# Patient Record
Sex: MALE | Race: WHITE | NOT HISPANIC OR LATINO
[De-identification: names, ages, dates, MRNs, and addresses within clinical notes are randomized per-mention and may not be internally consistent; named-entity substitution may affect disease eponyms.]

---

## 2023-03-12 PROBLEM — Z00.00 ENCOUNTER FOR PREVENTIVE HEALTH EXAMINATION: Status: ACTIVE | Noted: 2023-03-12

## 2023-03-13 ENCOUNTER — APPOINTMENT (OUTPATIENT)
Dept: ULTRASOUND IMAGING | Facility: CLINIC | Age: 58
End: 2023-03-13

## 2023-03-18 ENCOUNTER — APPOINTMENT (OUTPATIENT)
Dept: ULTRASOUND IMAGING | Facility: CLINIC | Age: 58
End: 2023-03-18
Payer: COMMERCIAL

## 2023-03-18 ENCOUNTER — OUTPATIENT (OUTPATIENT)
Dept: OUTPATIENT SERVICES | Facility: HOSPITAL | Age: 58
LOS: 1 days | End: 2023-03-18
Payer: COMMERCIAL

## 2023-03-18 DIAGNOSIS — Z00.8 ENCOUNTER FOR OTHER GENERAL EXAMINATION: ICD-10-CM

## 2023-03-18 DIAGNOSIS — Z98.89 OTHER SPECIFIED POSTPROCEDURAL STATES: Chronic | ICD-10-CM

## 2023-03-18 PROCEDURE — 93970 EXTREMITY STUDY: CPT

## 2023-03-18 PROCEDURE — 93970 EXTREMITY STUDY: CPT | Mod: 26

## 2023-07-08 ENCOUNTER — EMERGENCY (EMERGENCY)
Facility: HOSPITAL | Age: 58
LOS: 1 days | Discharge: HOME | End: 2023-07-08
Attending: EMERGENCY MEDICINE | Admitting: EMERGENCY MEDICINE
Payer: COMMERCIAL

## 2023-07-08 VITALS
HEIGHT: 66 IN | SYSTOLIC BLOOD PRESSURE: 176 MMHG | WEIGHT: 259.93 LBS | TEMPERATURE: 98 F | DIASTOLIC BLOOD PRESSURE: 96 MMHG | OXYGEN SATURATION: 95 % | RESPIRATION RATE: 20 BRPM | HEART RATE: 128 BPM

## 2023-07-08 VITALS
OXYGEN SATURATION: 99 % | RESPIRATION RATE: 18 BRPM | HEART RATE: 78 BPM | SYSTOLIC BLOOD PRESSURE: 118 MMHG | DIASTOLIC BLOOD PRESSURE: 62 MMHG | TEMPERATURE: 98 F

## 2023-07-08 DIAGNOSIS — Z98.89 OTHER SPECIFIED POSTPROCEDURAL STATES: Chronic | ICD-10-CM

## 2023-07-08 LAB
ALBUMIN SERPL ELPH-MCNC: 3.3 G/DL — SIGNIFICANT CHANGE UP (ref 3.3–5)
ALP SERPL-CCNC: 55 U/L — SIGNIFICANT CHANGE UP (ref 40–120)
ALT FLD-CCNC: 27 U/L — SIGNIFICANT CHANGE UP (ref 12–78)
ANION GAP SERPL CALC-SCNC: 8 MMOL/L — SIGNIFICANT CHANGE UP (ref 5–17)
APPEARANCE UR: CLEAR — SIGNIFICANT CHANGE UP
APTT BLD: 32.7 SEC — SIGNIFICANT CHANGE UP (ref 27.5–35.5)
AST SERPL-CCNC: 23 U/L — SIGNIFICANT CHANGE UP (ref 15–37)
BASE EXCESS BLDA CALC-SCNC: 0.9 MMOL/L — SIGNIFICANT CHANGE UP (ref -2–3)
BASOPHILS # BLD AUTO: 0.01 K/UL — SIGNIFICANT CHANGE UP (ref 0–0.2)
BASOPHILS NFR BLD AUTO: 0.2 % — SIGNIFICANT CHANGE UP (ref 0–2)
BILIRUB SERPL-MCNC: 1.4 MG/DL — HIGH (ref 0.2–1.2)
BILIRUB UR-MCNC: NEGATIVE — SIGNIFICANT CHANGE UP
BLOOD GAS COMMENTS ARTERIAL: SIGNIFICANT CHANGE UP
BUN SERPL-MCNC: 12 MG/DL — SIGNIFICANT CHANGE UP (ref 7–23)
CALCIUM SERPL-MCNC: 9.4 MG/DL — SIGNIFICANT CHANGE UP (ref 8.5–10.1)
CHLORIDE SERPL-SCNC: 102 MMOL/L — SIGNIFICANT CHANGE UP (ref 96–108)
CK MB BLD-MCNC: 1 % — SIGNIFICANT CHANGE UP (ref 0–3.5)
CK MB CFR SERPL CALC: 2.7 NG/ML — SIGNIFICANT CHANGE UP (ref 0–3.6)
CK SERPL-CCNC: 260 U/L — SIGNIFICANT CHANGE UP (ref 26–308)
CO2 SERPL-SCNC: 25 MMOL/L — SIGNIFICANT CHANGE UP (ref 22–31)
COLOR SPEC: YELLOW — SIGNIFICANT CHANGE UP
CREAT SERPL-MCNC: 1.1 MG/DL — SIGNIFICANT CHANGE UP (ref 0.5–1.3)
D DIMER BLD IA.RAPID-MCNC: 536 NG/ML DDU — HIGH
DIFF PNL FLD: ABNORMAL
EGFR: 77 ML/MIN/1.73M2 — SIGNIFICANT CHANGE UP
EOSINOPHIL # BLD AUTO: 0 K/UL — SIGNIFICANT CHANGE UP (ref 0–0.5)
EOSINOPHIL NFR BLD AUTO: 0 % — SIGNIFICANT CHANGE UP (ref 0–6)
GLUCOSE SERPL-MCNC: 184 MG/DL — HIGH (ref 70–99)
GLUCOSE UR QL: >=1000 MG/DL
HCO3 BLDA-SCNC: 25 MMOL/L — SIGNIFICANT CHANGE UP (ref 21–28)
HCT VFR BLD CALC: 42.6 % — SIGNIFICANT CHANGE UP (ref 39–50)
HGB BLD-MCNC: 14.6 G/DL — SIGNIFICANT CHANGE UP (ref 13–17)
IMM GRANULOCYTES NFR BLD AUTO: 0.4 % — SIGNIFICANT CHANGE UP (ref 0–0.9)
INR BLD: 2.4 RATIO — HIGH (ref 0.88–1.16)
KETONES UR-MCNC: 15 MG/DL
LACTATE SERPL-SCNC: 2.1 MMOL/L — HIGH (ref 0.7–2)
LACTATE SERPL-SCNC: 4.4 MMOL/L — CRITICAL HIGH (ref 0.7–2)
LEUKOCYTE ESTERASE UR-ACNC: NEGATIVE — SIGNIFICANT CHANGE UP
LIDOCAIN IGE QN: 155 U/L — SIGNIFICANT CHANGE UP (ref 73–393)
LYMPHOCYTES # BLD AUTO: 0.31 K/UL — LOW (ref 1–3.3)
LYMPHOCYTES # BLD AUTO: 6.5 % — LOW (ref 13–44)
MCHC RBC-ENTMCNC: 29.4 PG — SIGNIFICANT CHANGE UP (ref 27–34)
MCHC RBC-ENTMCNC: 34.3 GM/DL — SIGNIFICANT CHANGE UP (ref 32–36)
MCV RBC AUTO: 85.9 FL — SIGNIFICANT CHANGE UP (ref 80–100)
MONOCYTES # BLD AUTO: 0.07 K/UL — SIGNIFICANT CHANGE UP (ref 0–0.9)
MONOCYTES NFR BLD AUTO: 1.5 % — LOW (ref 2–14)
NEUTROPHILS # BLD AUTO: 4.38 K/UL — SIGNIFICANT CHANGE UP (ref 1.8–7.4)
NEUTROPHILS NFR BLD AUTO: 91.4 % — HIGH (ref 43–77)
NITRITE UR-MCNC: NEGATIVE — SIGNIFICANT CHANGE UP
NRBC # BLD: 0 /100 WBCS — SIGNIFICANT CHANGE UP (ref 0–0)
NT-PROBNP SERPL-SCNC: 145 PG/ML — HIGH (ref 0–125)
PCO2 BLDA: 38 MMHG — SIGNIFICANT CHANGE UP (ref 35–48)
PH BLDA: 7.43 — SIGNIFICANT CHANGE UP (ref 7.35–7.45)
PH UR: 5.5 — SIGNIFICANT CHANGE UP (ref 5–8)
PLATELET # BLD AUTO: 153 K/UL — SIGNIFICANT CHANGE UP (ref 150–400)
PO2 BLDA: 67 MMHG — LOW (ref 83–108)
POTASSIUM SERPL-MCNC: 3.2 MMOL/L — LOW (ref 3.5–5.3)
POTASSIUM SERPL-SCNC: 3.2 MMOL/L — LOW (ref 3.5–5.3)
PROT SERPL-MCNC: 6.8 G/DL — SIGNIFICANT CHANGE UP (ref 6–8.3)
PROT UR-MCNC: >=1000 MG/DL
PROTHROM AB SERPL-ACNC: 28.3 SEC — HIGH (ref 10.5–13.4)
RAPID RVP RESULT: SIGNIFICANT CHANGE UP
RBC # BLD: 4.96 M/UL — SIGNIFICANT CHANGE UP (ref 4.2–5.8)
RBC # FLD: 12.7 % — SIGNIFICANT CHANGE UP (ref 10.3–14.5)
SAO2 % BLDA: 95.6 % — SIGNIFICANT CHANGE UP (ref 94–98)
SARS-COV-2 RNA SPEC QL NAA+PROBE: SIGNIFICANT CHANGE UP
SODIUM SERPL-SCNC: 135 MMOL/L — SIGNIFICANT CHANGE UP (ref 135–145)
SP GR SPEC: 1.02 — SIGNIFICANT CHANGE UP (ref 1–1.03)
TROPONIN I, HIGH SENSITIVITY RESULT: 228.1 NG/L — HIGH
TROPONIN I, HIGH SENSITIVITY RESULT: 237.4 NG/L — HIGH
TROPONIN I, HIGH SENSITIVITY RESULT: 88.2 NG/L — HIGH
UROBILINOGEN FLD QL: 0.2 MG/DL — SIGNIFICANT CHANGE UP (ref 0.2–1)
WBC # BLD: 4.79 K/UL — SIGNIFICANT CHANGE UP (ref 3.8–10.5)
WBC # FLD AUTO: 4.79 K/UL — SIGNIFICANT CHANGE UP (ref 3.8–10.5)

## 2023-07-08 PROCEDURE — 71275 CT ANGIOGRAPHY CHEST: CPT | Mod: 26,MA

## 2023-07-08 PROCEDURE — 0225U NFCT DS DNA&RNA 21 SARSCOV2: CPT

## 2023-07-08 PROCEDURE — 82962 GLUCOSE BLOOD TEST: CPT

## 2023-07-08 PROCEDURE — 84484 ASSAY OF TROPONIN QUANT: CPT

## 2023-07-08 PROCEDURE — 83605 ASSAY OF LACTIC ACID: CPT

## 2023-07-08 PROCEDURE — 96375 TX/PRO/DX INJ NEW DRUG ADDON: CPT | Mod: XU

## 2023-07-08 PROCEDURE — 93010 ELECTROCARDIOGRAM REPORT: CPT | Mod: 76

## 2023-07-08 PROCEDURE — 85610 PROTHROMBIN TIME: CPT

## 2023-07-08 PROCEDURE — 87086 URINE CULTURE/COLONY COUNT: CPT

## 2023-07-08 PROCEDURE — 99285 EMERGENCY DEPT VISIT HI MDM: CPT

## 2023-07-08 PROCEDURE — 83880 ASSAY OF NATRIURETIC PEPTIDE: CPT

## 2023-07-08 PROCEDURE — 96374 THER/PROPH/DIAG INJ IV PUSH: CPT | Mod: XU

## 2023-07-08 PROCEDURE — 82553 CREATINE MB FRACTION: CPT

## 2023-07-08 PROCEDURE — 71045 X-RAY EXAM CHEST 1 VIEW: CPT | Mod: 26

## 2023-07-08 PROCEDURE — 87040 BLOOD CULTURE FOR BACTERIA: CPT

## 2023-07-08 PROCEDURE — 99284 EMERGENCY DEPT VISIT MOD MDM: CPT

## 2023-07-08 PROCEDURE — 71275 CT ANGIOGRAPHY CHEST: CPT | Mod: MA

## 2023-07-08 PROCEDURE — 71045 X-RAY EXAM CHEST 1 VIEW: CPT

## 2023-07-08 PROCEDURE — 81001 URINALYSIS AUTO W/SCOPE: CPT

## 2023-07-08 PROCEDURE — 83690 ASSAY OF LIPASE: CPT

## 2023-07-08 PROCEDURE — 93005 ELECTROCARDIOGRAM TRACING: CPT

## 2023-07-08 PROCEDURE — 82550 ASSAY OF CK (CPK): CPT

## 2023-07-08 PROCEDURE — 36415 COLL VENOUS BLD VENIPUNCTURE: CPT

## 2023-07-08 PROCEDURE — 99285 EMERGENCY DEPT VISIT HI MDM: CPT | Mod: 25

## 2023-07-08 PROCEDURE — 82803 BLOOD GASES ANY COMBINATION: CPT

## 2023-07-08 PROCEDURE — 85025 COMPLETE CBC W/AUTO DIFF WBC: CPT

## 2023-07-08 PROCEDURE — 80053 COMPREHEN METABOLIC PANEL: CPT

## 2023-07-08 PROCEDURE — 85379 FIBRIN DEGRADATION QUANT: CPT

## 2023-07-08 PROCEDURE — 85730 THROMBOPLASTIN TIME PARTIAL: CPT

## 2023-07-08 RX ORDER — IBUPROFEN 200 MG
600 TABLET ORAL ONCE
Refills: 0 | Status: COMPLETED | OUTPATIENT
Start: 2023-07-08 | End: 2023-07-08

## 2023-07-08 RX ORDER — MORPHINE SULFATE 50 MG/1
4 CAPSULE, EXTENDED RELEASE ORAL ONCE
Refills: 0 | Status: DISCONTINUED | OUTPATIENT
Start: 2023-07-08 | End: 2023-07-08

## 2023-07-08 RX ORDER — POTASSIUM CHLORIDE 20 MEQ
40 PACKET (EA) ORAL ONCE
Refills: 0 | Status: COMPLETED | OUTPATIENT
Start: 2023-07-08 | End: 2023-07-08

## 2023-07-08 RX ORDER — SODIUM CHLORIDE 9 MG/ML
1000 INJECTION INTRAMUSCULAR; INTRAVENOUS; SUBCUTANEOUS ONCE
Refills: 0 | Status: COMPLETED | OUTPATIENT
Start: 2023-07-08 | End: 2023-07-08

## 2023-07-08 RX ORDER — ONDANSETRON 8 MG/1
4 TABLET, FILM COATED ORAL ONCE
Refills: 0 | Status: COMPLETED | OUTPATIENT
Start: 2023-07-08 | End: 2023-07-08

## 2023-07-08 RX ADMIN — SODIUM CHLORIDE 1000 MILLILITER(S): 9 INJECTION INTRAMUSCULAR; INTRAVENOUS; SUBCUTANEOUS at 06:15

## 2023-07-08 RX ADMIN — MORPHINE SULFATE 4 MILLIGRAM(S): 50 CAPSULE, EXTENDED RELEASE ORAL at 03:49

## 2023-07-08 RX ADMIN — Medication 600 MILLIGRAM(S): at 12:53

## 2023-07-08 RX ADMIN — Medication 40 MILLIEQUIVALENT(S): at 06:55

## 2023-07-08 RX ADMIN — ONDANSETRON 4 MILLIGRAM(S): 8 TABLET, FILM COATED ORAL at 03:49

## 2023-07-08 RX ADMIN — MORPHINE SULFATE 4 MILLIGRAM(S): 50 CAPSULE, EXTENDED RELEASE ORAL at 04:04

## 2023-07-08 NOTE — ED ADULT NURSE NOTE - OBJECTIVE STATEMENT
57 y/o male BIBA. Alert and oriented x4. C/o SOB. Pt denies any cp, n/v/d, dizziness, headache, fever/chills at this time. Respirations even and slightly labored.

## 2023-07-08 NOTE — CONSULT NOTE ADULT - ASSESSMENT
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  Symptoms lasted about 30min.  He also mentions lower back pain for the past 3 days.  He said he was shaking so much that he could not drink water.  Denies chest pain.  Denies leg pain or swelling.  Patient reports tactile fever that also started when he awakened with symptoms.  Denies cough.  Denies urinary symptoms.   Denies smoking history.    He mentions that he saw a cardiologist last year (does not remember name) and had stress test 07/2022 which was "normal"    In ED CTA chest was neg for PE or dissection  Initially BP was elevated but now improved  Pt received morphine for back pain and zofran - back pain has improved  ECG shows NSR without ST-T wave abnormalities x2  Pt currently asymptomatic  Trop noted to be elevated 88 -> 228  Please repeat trop along with CK/CKMB    Thank you for this consult!

## 2023-07-08 NOTE — ED PROVIDER NOTE - PROGRESS NOTE DETAILS
Pt is feeling better. Denies SOB at this time  HR improved to low 90s  SPO2 94-95% on RA  Pt speaking in full sentences  CTA neg for PE or dissection  Will rpt LA and trop  Pulmonary consult requested Pt evaluated by Dr. Melgar, advised cardio eval. Cardio consult requested.   Pt will be endorsed to Dr. Mota to f/u rpt trop/LA and cardio eval. Patient was evaluated and then reevaluated by Dr. Shrestha, cardiologist and was aware of all labs as well as repeat labs including troponin CPK and CK-MB.  Patient was reevaluated at approximately 2 PM was feeling completely back to normal and wife who is at bedside agrees that he is back to normal and they want to go home and promised to follow-up with their primary cardiologist who they believe was Dr. Ro.

## 2023-07-08 NOTE — ED PROVIDER NOTE - OBJECTIVE STATEMENT
58-year-old male with history of diabetes, hypertension, BPH, hyperlipidemia presents complaining of sudden onset of shortness of breath started approximately 1:30 AM waking him up out of sleep.  Patient reports he was shaking and shivering and was diaphoretic.  Denies chest pain.  Denies leg pain or swelling.  Patient reports tactile fever that also started when he awakened with symptoms.  Denies cough.  Denies urinary symptoms.  Patient reports he is on Xarelto for history of PE and DVTs diagnosed last year.  Denies smoking history.  Patient reports in May or June he had repeat lower extremity Dopplers which showed resolution of the DVT.

## 2023-07-08 NOTE — ED ADULT NURSE NOTE - NSFALLUNIVINTERV_ED_ALL_ED
Bed/Stretcher in lowest position, wheels locked, appropriate side rails in place/Call bell, personal items and telephone in reach/Instruct patient to call for assistance before getting out of bed/chair/stretcher/Non-slip footwear applied when patient is off stretcher/Capac to call system/Physically safe environment - no spills, clutter or unnecessary equipment/Purposeful proactive rounding/Room/bathroom lighting operational, light cord in reach

## 2023-07-08 NOTE — ED PROVIDER NOTE - CLINICAL SUMMARY MEDICAL DECISION MAKING FREE TEXT BOX
58-year-old male with acute onset of dyspnea waking him up out of sleep proximately 1:30 AM.  Denies chest pain.  Patient was in his usual state of health when he went to bed.  Feeling better once he has arrived to the ED without any interventions.  Patient with history of DVT and PE for which she is on Xarelto starting last year.  Will evaluate for PE, ACS, CHF, PNA, PND. We will get labs, EKG, chest x-ray, CT angio.  Will reassess.

## 2023-07-08 NOTE — CONSULT NOTE ADULT - SUBJECTIVE AND OBJECTIVE BOX
Date/Time Patient Seen:  		  Referring MD:   Data Reviewed	       Patient is a 58y old  Male who presents with a chief complaint of     Subjective/HPI   58-year-old male with history of diabetes, hypertension, BPH, hyperlipidemia presents complaining of sudden onset of shortness of breath  PAST MEDICAL & SURGICAL HISTORY:  Type 2 diabetes mellitus    Hyperlipidemia    Hypertension    History of colonoscopy  ()    PAST SURGICAL HISTORY:  History of colonoscopy ().     FAMILY HISTORY:  Father  Still living? No  Family history of stroke, Age at diagnosis: 81-90    Mother  Still living? No  Family history of dementia, Age at diagnosis: Age Unknown    Sibling  Still living? Yes, Estimated age: Age Unknown  Family history of Alzheimer's disease, Age at diagnosis: Age Unknown.     Tobacco Usage:  · Tobacco Usage	Never smoker    ALLERGIES AND HOME MEDICATIONS:   Allergies:        Allergies:  	No Known Allergies:     Home Medications:   * Patient Currently Takes Medications as of 2016 12:23 documented in Structured Notes  · 	Pyridium 200 mg oral tablet: 1 tab(s) orally 3 times a day (after meals), As Needed for urinary discomfort  · 	METFORMIN    TAB 500Mmg orally 2 times a day  · 	VALSARTAN    TAB 320MG:  orally once a day (at bedtime)  · 	ATORVASTATIN TAB 10MG:  orally once a day (at bedtime)  · 	AMLODIPINE   TAB 5MG:  orally once a day  · 	multivitamin:   once a day    VITAL SIGNS ([place]):    ,,ED ADULT Flow Sheet:    2023 02:30  · Temp (F): 98  · Temp (C) Temp (C): 36.7  · Temp site Temp Site: oral  · Heart Rate Heart Rate (beats/min): 128  · BP Systolic Systolic: 176  · BP Diastolic Diastolic (mm Hg): 96  · Respiration Rate (breaths/min) Respiration Rate (breaths/min): 20  · SpO2 (%) SpO2 (%): 95  · O2 Delivery/Oxygen Delivery Method Patient On (Oxygen Delivery Method): room air  · How was the weight captured? Weight Type/Method: stated  · Dosing Weight (KILOGRAMS) Dosing Weight (KILOGRAMS): 117.9  · Dosing Weight  (POUNDS) Dosing Weight (POUNDS): 259.9  · Height type Height Type: stated  · Height (FEET) Height (FEET): 5  · Height (INCHES) Height (INCHES): 6  · Height (CENTIMETERS) Height (CENTIMETERS): 167.64  · BSA (m2): 2.24  · BMI (kG/m2) BMI (kG/m2): 42  · Ideal Body Weight(kg) Ideal Body Weight(kg): 64  · SpO2 (%) SpO2 (%): 95  · Preferred Language to Address Healthcare Preferred Language to Address Healthcare: English        Medication list         MEDICATIONS  (STANDING):    MEDICATIONS  (PRN):         Vitals log        ICU Vital Signs Last 24 Hrs  T(C): 36.7 (2023 02:30), Max: 36.7 (2023 02:30)  T(F): 98 (2023 02:30), Max: 98 (2023 02:30)  HR: 128 (2023 02:30) (128 - 128)  BP: 176/96 (2023 02:30) (176/96 - 176/96)  BP(mean): --  ABP: --  ABP(mean): --  RR: 20 (2023 02:30) (20 - 20)  SpO2: 95% (2023 02:30) (95% - 95%)    O2 Parameters below as of 2023 02:30  Patient On (Oxygen Delivery Method): room air                 Input and Output:  I&O's Detail      Lab Data                        14.6   4.79  )-----------( 153      ( 2023 04:10 )             42.6     07-08    135  |  102  |  12  ----------------------------<  184<H>  3.2<L>   |  25  |  1.10    Ca    9.4      2023 04:10    TPro  6.8  /  Alb  3.3  /  TBili  1.4<H>  /  DBili  x   /  AST  23  /  ALT  27  /  AlkPhos  55  07-08    ABG - ( 2023 05:29 )  pH, Arterial: 7.43  pH, Blood: x     /  pCO2: 38    /  pO2: 67    / HCO3: 25    / Base Excess: 0.9   /  SaO2: 95.6                    Review of Systems	  sob  anxious  back pain      Objective     Physical Examination  on o2 support  head nc  head at  verbal  alert  cn grossly int  moves all extr  chest symmetric        Pertinent Lab findings & Imaging      [place]:  NO   Adequate UO     I&O's Detail           Discussed with:     Cultures:	        Radiology      ACC: [id number] EXAM:  CT ANGIO CHEST PULM ART [facility]   ORDERED BY:  JOANNE SHEPARD     PROCEDURE DATE:  2023          INTERPRETATION:  CLINICAL INFORMATION: Shortness of breath, evaluate for   pulmonary embolism    COMPARISON: None.    CONTRAST/COMPLICATIONS:  IV Contrast: Omnipaque 300  70 cc administered   30 cc discarded  Oral Contrast: NONE  Complications: None reported at time of study completion    PROCEDURE:  CT Angiography of the Chest.  Sagittal and coronal reformats were performed as well as 3D (MIP)   reconstructions.    FINDINGS:    LUNGS AND AIRWAYS: Patent central airways.  Lungs are clear.  PLEURA: No pleural effusion.  MEDIASTINUM AND LILLY: No lymphadenopathy.  VESSELS: Pulmonary arterial opacification is suboptimal. There is no main   pulmonary embolism. Subsegmental branches cannot be assessed. There is no   aortic dissection. The great vessels are not dilated. Atherosclerotic   calcification of the thoracic aorta.  HEART: Heart size is normal. No pericardial effusion.  CHEST WALL AND LOWER NECK: Within normal limits.  VISUALIZED UPPER ABDOMEN: Fatty liver.  BONES: Within normal limits.    IMPRESSION:  No central pulmonary embolism. Subsegmental branches cannot be assessed.    No focal pulmonary opacity or pleural effusion.    --- End of Report ---            CATRACHITO HDEZ MD; Attending Radiologist  This document has been electronically signed. 2023  6:18AM

## 2023-07-08 NOTE — ED PROVIDER NOTE - NSFOLLOWUPINSTRUCTIONS_ED_ALL_ED_FT
Follow up with your primary care doctor in 2-3 days.   Return to the ER if symptoms worsen    Shortness of Breath, Adult  Shortness of breath means you have trouble breathing. Shortness of breath could be a sign of a medical problem.    Follow these instructions at home:  A sign showing that a person should not smoke.  Pollution    Do not smoke or use any products that contain nicotine or tobacco. If you need help quitting, ask your doctor.  Avoid things that can make it harder to breathe, such as:  Smoke of all kinds. This includes smoke from campfires or forest fires. Do not smoke or allow others to smoke in your home.  Mold.  Dust.  Air pollution.  Chemical smells.  Things that can give you an allergic reaction (allergens) if you have allergies.  Keep your living space clean. Use products that help remove mold and dust.  General instructions    Watch for any changes in your symptoms.  Take over-the-counter and prescription medicines only as told by your doctor. This includes oxygen therapy and inhaled medicines.  Rest as needed.  Return to your normal activities when your doctor says that it is safe.  Keep all follow-up visits.  Contact a doctor if:  Your condition does not get better as soon as expected.  You have a hard time doing your normal activities, even after you rest.  You have new symptoms.  You cannot walk up stairs.  You cannot exercise the way you normally do.  Get help right away if:  Your shortness of breath gets worse.  You have trouble breathing when you are resting.  You feel light-headed or you faint.  You have a cough that is not helped by medicines.  You cough up blood.  You have pain with breathing.  You have pain in your chest, arms, shoulders, or belly (abdomen).  You have a fever.  These symptoms may be an emergency. Get help right away. Call 911.  Do not wait to see if the symptoms will go away.  Do not drive yourself to the hospital.  Summary  Shortness of breath is when you have trouble breathing enough air. It can be a sign of a medical problem.  Avoid things that make it hard for you to breathe, such as smoking, pollution, mold, and dust.  Watch for any changes in your symptoms. Contact your doctor if you do not get better or you get worse.  This information is not intended to replace advice given to you by your health care provider. Make sure you discuss any questions you have with your health care provider.    Document Revised: 08/06/2022 Document Reviewed: 08/06/2022

## 2023-07-08 NOTE — ED PROVIDER NOTE - NSICDXFAMILYHX_GEN_ALL_CORE_FT
FAMILY HISTORY:  Father  Still living? No  Family history of stroke, Age at diagnosis: 81-90    Mother  Still living? No  Family history of dementia, Age at diagnosis: Age Unknown    Sibling  Still living? Yes, Estimated age: Age Unknown  Family history of Alzheimer's disease, Age at diagnosis: Age Unknown

## 2023-07-08 NOTE — ED PROVIDER NOTE - PATIENT PORTAL LINK FT
You can access the FollowMyHealth Patient Portal offered by [facility] by registering at the following website: [email or web address]. By joining [employer]’s FollowMyHealth portal, you will also be able to view your health information using other applications (apps) compatible with our system.

## 2023-07-08 NOTE — CONSULT NOTE ADULT - ASSESSMENT
58-year-old male with history of diabetes, hypertension, BPH, hyperlipidemia presents complaining of sudden onset of shortness of breath    suspect FERNY  Obesity  HTN  BPH  hx of DVT PE  DM  HLD    reported sudden dyspnea  ct chest - grossly - neg for pe - limited study, however, no central PE  suspect FERNY - eval as outpatient  check Sat on RA - at rest and on exertion  goal sat > 90 pct   back pain eval -   Cardio eval - trops noted  pt is on Xarelto - for hx of DVT PE - reports full compliance, reflected in COAGS  proBNP noted - ABG noted -   Lactic acid noted - would repeat prior to discharge  no acute pulm path on current exam

## 2023-07-08 NOTE — CONSULT NOTE ADULT - SUBJECTIVE AND OBJECTIVE BOX
[facility] - Pedro Saeed, Rodrick Rosario, Fady Noland  Office Number: [phone]    Initial Consult Note    CHIEF COMPLAINT: Patient is a 58y old  Male who presents with a chief complaint of shaking chills    HPI: 58-year-old male with history of PE/DVT 08/2022 on xarelto, diabetes, hypertension, hyperlipidemia presents complaining of shaking chills and SOB which woke him from sleep last night.  Symptoms lasted about 30min.  He also mentions lower back pain for the past 3 days.  He said he was shaking so much that he could not drink water.  Denies chest pain.  Denies leg pain or swelling.  Patient reports tactile fever that also started when he awakened with symptoms.  Denies cough.  Denies urinary symptoms.   Denies smoking history.    He mentions that he saw a cardiologist last year (does not remember name) and had stress test 07/2022 which was "normal"    In ED CTA chest was neg for PE or dissection  Initially BP was elevated but now improved  Pt received morphine for back pain and zofran  Trop noted to be elevated 88 -> 228  ECG shows NSR without ST-T wave abnormalities      PAST MEDICAL & SURGICAL HISTORY:  Type 2 diabetes mellitus      Hyperlipidemia      Hypertension      History of colonoscopy  (2014)          SOCIAL HISTORY:  No tobacco, ethanol, or drug abuse.    FAMILY HISTORY:  Family history of stroke (Father)    Family history of dementia (Mother)    Family history of Alzheimer's disease (Sibling)      No family history of acute MI or sudden cardiac death.    MEDICATIONS  (STANDING):    MEDICATIONS  (PRN):      Allergies    No Known Allergies    Intolerances        REVIEW OF SYSTEMS:    CONSTITUTIONAL: No weakness, +fevers and chills  EYES/ENT: No visual changes;  No vertigo or throat pain   NECK: No pain or stiffness  RESPIRATORY: No cough, wheezing, hemoptysis; + shortness of breath  CARDIOVASCULAR: No chest pain or palpitations  GASTROINTESTINAL: No abdominal pain. No nausea, vomiting, or hematemesis; No diarrhea or constipation. No melena or hematochezia.  GENITOURINARY: No dysuria, frequency or hematuria  NEUROLOGICAL: No numbness or weakness  SKIN: No itching or rash  All other review of systems is negative unless indicated above    VITAL SIGNS:   Vital Signs Last 24 Hrs  T(C): 36.4 (08 Jul 2023 07:54), Max: 36.7 (08 Jul 2023 02:30)  T(F): 97.6 (08 Jul 2023 07:54), Max: 98 (08 Jul 2023 02:30)  HR: 81 (08 Jul 2023 07:54) (81 - 128)  BP: 115/52 (08 Jul 2023 07:54) (115/52 - 176/96)  BP(mean): --  RR: 17 (08 Jul 2023 07:54) (17 - 20)  SpO2: 98% (08 Jul 2023 07:54) (95% - 98%)    Parameters below as of 08 Jul 2023 07:54  Patient On (Oxygen Delivery Method): nasal cannula  O2 Flow (L/min): 2      I&O's Summary      On Exam:    Constitutional: NAD, alert and oriented x 3  Lungs:  Non-labored, breath sounds are clear bilaterally, No wheezing, rales or rhonchi  Cardiovascular: RRR.  S1 and S2 positive.  No murmurs, rubs, gallops or clicks  Gastrointestinal: Bowel Sounds present, soft, nontender.   Lymph: No peripheral edema. No cervical lymphadenopathy.  Neurological: Alert, no focal deficits  Skin: No rashes or ulcers   Psych:  Mood & affect appropriate.    LABS: All Labs Reviewed:                        14.6   4.79  )-----------( 153      ( 08 Jul 2023 04:10 )             42.6     08 Jul 2023 04:10    135    |  102    |  12     ----------------------------<  184    3.2     |  25     |  1.10     Ca    9.4        08 Jul 2023 04:10    TPro  6.8    /  Alb  3.3    /  TBili  1.4    /  DBili  x      /  AST  23     /  ALT  27     /  AlkPhos  55     08 Jul 2023 04:10    PT/INR - ( 08 Jul 2023 04:10 )   PT: 28.3 sec;   INR: 2.40 ratio         PTT - ( 08 Jul 2023 04:10 )  PTT:32.7 sec      Blood Culture:         RADIOLOGY:    EKG:

## 2023-07-09 LAB
CULTURE RESULTS: SIGNIFICANT CHANGE UP
SPECIMEN SOURCE: SIGNIFICANT CHANGE UP

## 2023-07-10 ENCOUNTER — INPATIENT (INPATIENT)
Facility: HOSPITAL | Age: 58
LOS: 3 days | Discharge: HOME | DRG: 864 | End: 2023-07-14
Attending: INTERNAL MEDICINE | Admitting: HOSPITALIST
Payer: COMMERCIAL

## 2023-07-10 VITALS
WEIGHT: 259.93 LBS | HEART RATE: 123 BPM | OXYGEN SATURATION: 95 % | DIASTOLIC BLOOD PRESSURE: 99 MMHG | SYSTOLIC BLOOD PRESSURE: 174 MMHG | RESPIRATION RATE: 30 BRPM | HEIGHT: 66 IN | TEMPERATURE: 103 F

## 2023-07-10 DIAGNOSIS — Z98.89 OTHER SPECIFIED POSTPROCEDURAL STATES: Chronic | ICD-10-CM

## 2023-07-10 DIAGNOSIS — R50.9 FEVER, UNSPECIFIED: ICD-10-CM

## 2023-07-10 LAB
ALBUMIN SERPL ELPH-MCNC: 3.1 G/DL — LOW (ref 3.3–5)
ALP SERPL-CCNC: 63 U/L — SIGNIFICANT CHANGE UP (ref 40–120)
ALT FLD-CCNC: 30 U/L — SIGNIFICANT CHANGE UP (ref 12–78)
ANION GAP SERPL CALC-SCNC: 10 MMOL/L — SIGNIFICANT CHANGE UP (ref 5–17)
APPEARANCE UR: CLEAR — SIGNIFICANT CHANGE UP
APTT BLD: 26 SEC — LOW (ref 27.5–35.5)
AST SERPL-CCNC: 43 U/L — HIGH (ref 15–37)
BACTERIA # UR AUTO: ABNORMAL /HPF
BASOPHILS # BLD AUTO: 0.01 K/UL — SIGNIFICANT CHANGE UP (ref 0–0.2)
BASOPHILS NFR BLD AUTO: 0.2 % — SIGNIFICANT CHANGE UP (ref 0–2)
BILIRUB SERPL-MCNC: 0.8 MG/DL — SIGNIFICANT CHANGE UP (ref 0.2–1.2)
BILIRUB UR-MCNC: NEGATIVE — SIGNIFICANT CHANGE UP
BUN SERPL-MCNC: 14 MG/DL — SIGNIFICANT CHANGE UP (ref 7–23)
CALCIUM SERPL-MCNC: 9.6 MG/DL — SIGNIFICANT CHANGE UP (ref 8.5–10.1)
CHLORIDE SERPL-SCNC: 102 MMOL/L — SIGNIFICANT CHANGE UP (ref 96–108)
CO2 SERPL-SCNC: 24 MMOL/L — SIGNIFICANT CHANGE UP (ref 22–31)
COLOR SPEC: YELLOW — SIGNIFICANT CHANGE UP
CREAT SERPL-MCNC: 1.1 MG/DL — SIGNIFICANT CHANGE UP (ref 0.5–1.3)
CRP SERPL-MCNC: 162 MG/L — HIGH
DIFF PNL FLD: ABNORMAL
EGFR: 77 ML/MIN/1.73M2 — SIGNIFICANT CHANGE UP
EOSINOPHIL # BLD AUTO: 0 K/UL — SIGNIFICANT CHANGE UP (ref 0–0.5)
EOSINOPHIL NFR BLD AUTO: 0 % — SIGNIFICANT CHANGE UP (ref 0–6)
EPI CELLS # UR: SIGNIFICANT CHANGE UP
GLUCOSE SERPL-MCNC: 219 MG/DL — HIGH (ref 70–99)
GLUCOSE UR QL: >=1000 MG/DL
HCT VFR BLD CALC: 42 % — SIGNIFICANT CHANGE UP (ref 39–50)
HGB BLD-MCNC: 14.2 G/DL — SIGNIFICANT CHANGE UP (ref 13–17)
IMM GRANULOCYTES NFR BLD AUTO: 0.2 % — SIGNIFICANT CHANGE UP (ref 0–0.9)
INR BLD: 1.2 RATIO — HIGH (ref 0.88–1.16)
KETONES UR-MCNC: ABNORMAL MG/DL
LACTATE SERPL-SCNC: 1.3 MMOL/L — SIGNIFICANT CHANGE UP (ref 0.7–2)
LACTATE SERPL-SCNC: 4.3 MMOL/L — CRITICAL HIGH (ref 0.7–2)
LEUKOCYTE ESTERASE UR-ACNC: NEGATIVE — SIGNIFICANT CHANGE UP
LYMPHOCYTES # BLD AUTO: 0.9 K/UL — LOW (ref 1–3.3)
LYMPHOCYTES # BLD AUTO: 22.2 % — SIGNIFICANT CHANGE UP (ref 13–44)
MCHC RBC-ENTMCNC: 29.1 PG — SIGNIFICANT CHANGE UP (ref 27–34)
MCHC RBC-ENTMCNC: 33.8 GM/DL — SIGNIFICANT CHANGE UP (ref 32–36)
MCV RBC AUTO: 86.1 FL — SIGNIFICANT CHANGE UP (ref 80–100)
MONOCYTES # BLD AUTO: 0.17 K/UL — SIGNIFICANT CHANGE UP (ref 0–0.9)
MONOCYTES NFR BLD AUTO: 4.2 % — SIGNIFICANT CHANGE UP (ref 2–14)
NEUTROPHILS # BLD AUTO: 2.97 K/UL — SIGNIFICANT CHANGE UP (ref 1.8–7.4)
NEUTROPHILS NFR BLD AUTO: 73.2 % — SIGNIFICANT CHANGE UP (ref 43–77)
NITRITE UR-MCNC: NEGATIVE — SIGNIFICANT CHANGE UP
NRBC # BLD: 0 /100 WBCS — SIGNIFICANT CHANGE UP (ref 0–0)
PH UR: 5.5 — SIGNIFICANT CHANGE UP (ref 5–8)
PLATELET # BLD AUTO: 151 K/UL — SIGNIFICANT CHANGE UP (ref 150–400)
POTASSIUM SERPL-MCNC: 3.8 MMOL/L — SIGNIFICANT CHANGE UP (ref 3.5–5.3)
POTASSIUM SERPL-SCNC: 3.8 MMOL/L — SIGNIFICANT CHANGE UP (ref 3.5–5.3)
PROCALCITONIN SERPL-MCNC: 29.32 NG/ML — HIGH
PROT SERPL-MCNC: 7.1 G/DL — SIGNIFICANT CHANGE UP (ref 6–8.3)
PROT UR-MCNC: >=1000 MG/DL
PROTHROM AB SERPL-ACNC: 14.1 SEC — HIGH (ref 10.5–13.4)
PSA FLD-MCNC: 35.4 NG/ML — HIGH (ref 0–4)
RAPID RVP RESULT: SIGNIFICANT CHANGE UP
RBC # BLD: 4.88 M/UL — SIGNIFICANT CHANGE UP (ref 4.2–5.8)
RBC # FLD: 12.6 % — SIGNIFICANT CHANGE UP (ref 10.3–14.5)
RBC CASTS # UR COMP ASSIST: SIGNIFICANT CHANGE UP /HPF (ref 0–4)
SARS-COV-2 RNA SPEC QL NAA+PROBE: SIGNIFICANT CHANGE UP
SODIUM SERPL-SCNC: 136 MMOL/L — SIGNIFICANT CHANGE UP (ref 135–145)
SP GR SPEC: 1.02 — SIGNIFICANT CHANGE UP (ref 1–1.03)
TROPONIN I, HIGH SENSITIVITY RESULT: 1148.3 NG/L — HIGH
TROPONIN I, HIGH SENSITIVITY RESULT: 364.5 NG/L — HIGH
TROPONIN I, HIGH SENSITIVITY RESULT: 741.2 NG/L — HIGH
UROBILINOGEN FLD QL: 0.2 MG/DL — SIGNIFICANT CHANGE UP (ref 0.2–1)
WBC # BLD: 4.06 K/UL — SIGNIFICANT CHANGE UP (ref 3.8–10.5)
WBC # FLD AUTO: 4.06 K/UL — SIGNIFICANT CHANGE UP (ref 3.8–10.5)
WBC UR QL: SIGNIFICANT CHANGE UP /HPF (ref 0–5)

## 2023-07-10 PROCEDURE — 93010 ELECTROCARDIOGRAM REPORT: CPT

## 2023-07-10 PROCEDURE — 99285 EMERGENCY DEPT VISIT HI MDM: CPT

## 2023-07-10 PROCEDURE — 99223 1ST HOSP IP/OBS HIGH 75: CPT

## 2023-07-10 PROCEDURE — 74176 CT ABD & PELVIS W/O CONTRAST: CPT | Mod: 26,MA

## 2023-07-10 RX ORDER — SODIUM CHLORIDE 9 MG/ML
1000 INJECTION, SOLUTION INTRAVENOUS
Refills: 0 | Status: DISCONTINUED | OUTPATIENT
Start: 2023-07-10 | End: 2023-07-14

## 2023-07-10 RX ORDER — RIVAROXABAN 15 MG-20MG
1 KIT ORAL
Refills: 0 | DISCHARGE

## 2023-07-10 RX ORDER — DUTASTERIDE 0.5 MG/1
1 CAPSULE, LIQUID FILLED ORAL
Refills: 0 | DISCHARGE

## 2023-07-10 RX ORDER — ACETAMINOPHEN 500 MG
650 TABLET ORAL EVERY 6 HOURS
Refills: 0 | Status: DISCONTINUED | OUTPATIENT
Start: 2023-07-10 | End: 2023-07-14

## 2023-07-10 RX ORDER — OXYCODONE HYDROCHLORIDE 5 MG/1
5 TABLET ORAL EVERY 4 HOURS
Refills: 0 | Status: DISCONTINUED | OUTPATIENT
Start: 2023-07-10 | End: 2023-07-11

## 2023-07-10 RX ORDER — GLIMEPIRIDE 1 MG
1 TABLET ORAL
Refills: 0 | DISCHARGE

## 2023-07-10 RX ORDER — ASPIRIN/CALCIUM CARB/MAGNESIUM 324 MG
324 TABLET ORAL ONCE
Refills: 0 | Status: COMPLETED | OUTPATIENT
Start: 2023-07-10 | End: 2023-07-10

## 2023-07-10 RX ORDER — GLUCAGON INJECTION, SOLUTION 0.5 MG/.1ML
1 INJECTION, SOLUTION SUBCUTANEOUS ONCE
Refills: 0 | Status: DISCONTINUED | OUTPATIENT
Start: 2023-07-10 | End: 2023-07-14

## 2023-07-10 RX ORDER — DEXTROSE 50 % IN WATER 50 %
15 SYRINGE (ML) INTRAVENOUS ONCE
Refills: 0 | Status: DISCONTINUED | OUTPATIENT
Start: 2023-07-10 | End: 2023-07-14

## 2023-07-10 RX ORDER — CEFTRIAXONE 500 MG/1
1000 INJECTION, POWDER, FOR SOLUTION INTRAMUSCULAR; INTRAVENOUS ONCE
Refills: 0 | Status: COMPLETED | OUTPATIENT
Start: 2023-07-10 | End: 2023-07-10

## 2023-07-10 RX ORDER — DEXTROSE 50 % IN WATER 50 %
25 SYRINGE (ML) INTRAVENOUS ONCE
Refills: 0 | Status: DISCONTINUED | OUTPATIENT
Start: 2023-07-10 | End: 2023-07-14

## 2023-07-10 RX ORDER — SODIUM CHLORIDE 9 MG/ML
2600 INJECTION INTRAMUSCULAR; INTRAVENOUS; SUBCUTANEOUS ONCE
Refills: 0 | Status: COMPLETED | OUTPATIENT
Start: 2023-07-10 | End: 2023-07-10

## 2023-07-10 RX ORDER — RIVAROXABAN 15 MG-20MG
20 KIT ORAL
Refills: 0 | Status: DISCONTINUED | OUTPATIENT
Start: 2023-07-10 | End: 2023-07-12

## 2023-07-10 RX ORDER — TAMSULOSIN HYDROCHLORIDE 0.4 MG/1
1 CAPSULE ORAL
Refills: 0 | DISCHARGE

## 2023-07-10 RX ORDER — ONDANSETRON 8 MG/1
4 TABLET, FILM COATED ORAL EVERY 8 HOURS
Refills: 0 | Status: DISCONTINUED | OUTPATIENT
Start: 2023-07-10 | End: 2023-07-14

## 2023-07-10 RX ORDER — DEXTROSE 50 % IN WATER 50 %
12.5 SYRINGE (ML) INTRAVENOUS ONCE
Refills: 0 | Status: DISCONTINUED | OUTPATIENT
Start: 2023-07-10 | End: 2023-07-14

## 2023-07-10 RX ORDER — AMLODIPINE BESYLATE 2.5 MG/1
5 TABLET ORAL DAILY
Refills: 0 | Status: DISCONTINUED | OUTPATIENT
Start: 2023-07-10 | End: 2023-07-14

## 2023-07-10 RX ORDER — INSULIN LISPRO 100/ML
VIAL (ML) SUBCUTANEOUS
Refills: 0 | Status: DISCONTINUED | OUTPATIENT
Start: 2023-07-10 | End: 2023-07-14

## 2023-07-10 RX ORDER — ACETAMINOPHEN 500 MG
650 TABLET ORAL ONCE
Refills: 0 | Status: COMPLETED | OUTPATIENT
Start: 2023-07-10 | End: 2023-07-10

## 2023-07-10 RX ORDER — KETOROLAC TROMETHAMINE 30 MG/ML
30 SYRINGE (ML) INJECTION ONCE
Refills: 0 | Status: DISCONTINUED | OUTPATIENT
Start: 2023-07-10 | End: 2023-07-10

## 2023-07-10 RX ORDER — LANOLIN ALCOHOL/MO/W.PET/CERES
3 CREAM (GRAM) TOPICAL AT BEDTIME
Refills: 0 | Status: DISCONTINUED | OUTPATIENT
Start: 2023-07-10 | End: 2023-07-14

## 2023-07-10 RX ORDER — HYDROMORPHONE HYDROCHLORIDE 2 MG/ML
0.5 INJECTION INTRAMUSCULAR; INTRAVENOUS; SUBCUTANEOUS EVERY 4 HOURS
Refills: 0 | Status: DISCONTINUED | OUTPATIENT
Start: 2023-07-10 | End: 2023-07-14

## 2023-07-10 RX ORDER — ATORVASTATIN CALCIUM 80 MG/1
10 TABLET, FILM COATED ORAL AT BEDTIME
Refills: 0 | Status: DISCONTINUED | OUTPATIENT
Start: 2023-07-10 | End: 2023-07-12

## 2023-07-10 RX ORDER — FINASTERIDE 5 MG/1
5 TABLET, FILM COATED ORAL DAILY
Refills: 0 | Status: DISCONTINUED | OUTPATIENT
Start: 2023-07-10 | End: 2023-07-14

## 2023-07-10 RX ORDER — TAMSULOSIN HYDROCHLORIDE 0.4 MG/1
0.4 CAPSULE ORAL AT BEDTIME
Refills: 0 | Status: DISCONTINUED | OUTPATIENT
Start: 2023-07-10 | End: 2023-07-14

## 2023-07-10 RX ORDER — INSULIN GLARGINE 100 [IU]/ML
5 INJECTION, SOLUTION SUBCUTANEOUS AT BEDTIME
Refills: 0 | Status: DISCONTINUED | OUTPATIENT
Start: 2023-07-10 | End: 2023-07-11

## 2023-07-10 RX ORDER — INSULIN LISPRO 100/ML
2 VIAL (ML) SUBCUTANEOUS
Refills: 0 | Status: DISCONTINUED | OUTPATIENT
Start: 2023-07-10 | End: 2023-07-11

## 2023-07-10 RX ADMIN — Medication 2 UNIT(S): at 12:52

## 2023-07-10 RX ADMIN — Medication 324 MILLIGRAM(S): at 03:57

## 2023-07-10 RX ADMIN — CEFTRIAXONE 1000 MILLIGRAM(S): 500 INJECTION, POWDER, FOR SOLUTION INTRAMUSCULAR; INTRAVENOUS at 06:05

## 2023-07-10 RX ADMIN — Medication 30 MILLIGRAM(S): at 12:52

## 2023-07-10 RX ADMIN — AMLODIPINE BESYLATE 5 MILLIGRAM(S): 2.5 TABLET ORAL at 15:09

## 2023-07-10 RX ADMIN — TAMSULOSIN HYDROCHLORIDE 0.4 MILLIGRAM(S): 0.4 CAPSULE ORAL at 21:52

## 2023-07-10 RX ADMIN — CEFTRIAXONE 100 MILLIGRAM(S): 500 INJECTION, POWDER, FOR SOLUTION INTRAMUSCULAR; INTRAVENOUS at 05:32

## 2023-07-10 RX ADMIN — OXYCODONE HYDROCHLORIDE 5 MILLIGRAM(S): 5 TABLET ORAL at 21:02

## 2023-07-10 RX ADMIN — Medication 650 MILLIGRAM(S): at 01:50

## 2023-07-10 RX ADMIN — OXYCODONE HYDROCHLORIDE 5 MILLIGRAM(S): 5 TABLET ORAL at 20:04

## 2023-07-10 RX ADMIN — Medication 650 MILLIGRAM(S): at 17:19

## 2023-07-10 RX ADMIN — Medication 2: at 12:51

## 2023-07-10 RX ADMIN — SODIUM CHLORIDE 2600 MILLILITER(S): 9 INJECTION INTRAMUSCULAR; INTRAVENOUS; SUBCUTANEOUS at 04:15

## 2023-07-10 RX ADMIN — RIVAROXABAN 20 MILLIGRAM(S): KIT at 18:43

## 2023-07-10 RX ADMIN — Medication 1: at 17:18

## 2023-07-10 RX ADMIN — Medication 650 MILLIGRAM(S): at 02:50

## 2023-07-10 RX ADMIN — FINASTERIDE 5 MILLIGRAM(S): 5 TABLET, FILM COATED ORAL at 15:09

## 2023-07-10 RX ADMIN — Medication 30 MILLIGRAM(S): at 13:44

## 2023-07-10 RX ADMIN — ATORVASTATIN CALCIUM 10 MILLIGRAM(S): 80 TABLET, FILM COATED ORAL at 21:52

## 2023-07-10 RX ADMIN — Medication 2 UNIT(S): at 17:19

## 2023-07-10 RX ADMIN — SODIUM CHLORIDE 2600 MILLILITER(S): 9 INJECTION INTRAMUSCULAR; INTRAVENOUS; SUBCUTANEOUS at 01:50

## 2023-07-10 RX ADMIN — INSULIN GLARGINE 5 UNIT(S): 100 INJECTION, SOLUTION SUBCUTANEOUS at 21:53

## 2023-07-10 NOTE — CONSULT NOTE ADULT - ASSESSMENT
58-year-old male with history of diabetes, hypertension, BPH, hyperlipidemia - presents with FEVER - Back Pain - Dyspnea - Weakness    suspect FERNY  Obesity  HTN  BPH  hx of DVT PE  DM  HLD  Nephrolithiasis - Bladder Stones -     CT renal stone hunt noted -   Lactic acid trend noted - fever noted -   trops noted  emp ABX  ID and Cardio eval  DOAC for VTE - as per home rx regimen  FERNY eval as outpatient - weight management and sleep hygiene review - discussed  monitor VS and HD and Sat  UA - Cx - Biomarkers -   ACAP prn for Fever  DM care - serial FS -   recent visit on 7/8 - CT neg for PE, LE doppler neg for DVT, ABG noted

## 2023-07-10 NOTE — ED PROVIDER NOTE - OBJECTIVE STATEMENT
fever 103F, tachycardia, SOB , seen earlier for SOB, tactile fever  elevated lactate,  CT angio was negative fever 103F, tachycardia, SOB , seen earlier for SOB, tactile fever  elevated lactate, d-dimer and troponin   CT angio was negative evaluated by cardiology and pulmonary per ems from home with shortness of breath and lower back pain. pt was in ed Friday. oxygen saturation 86%.  fever 103F, tachycardia, SOB , seen earlier for SOB, tactile fever  elevated lactate, d-dimer and troponin   CT angio was negative evaluated by cardiology and pulmonary 59 y/o male h/o DM type-2 HTN HLD,  PE/DVT on Xarelto   per ems from home with shortness of breath and lower back pain.   In the ED he had of fever 103F, tachycardia, SOB, back pain, He was  seen in the ED 24 hours earlier for SOB, tactile fever,  back pain and hypoxemia,  He was found to have an  elevated lactate, d-dimer and troponin,    CT angio was negative evaluated by cardiology and pulmonary 57 y/o male h/o DM type-2 HTN HLD,  PE/DVT on Xarelto   per ems from home with shortness of breath and lower back pain.   In the ED he had of fever 103F, tachycardia, SOB, back pain, He was  seen in the ED 24 hours earlier for SOB, tactile fever,  back pain and hypoxemia,  He was found to have an  elevated lactate, d-dimer and troponin,    CT angio was negative evaluated by cardiology and pulmonary  PMD Dr Galvez 59 y/o male h/o DM type-2 HTN HLD,  PE/DVT on Xarelto   BIBEMS  with shortness of breath, palpitations,  chills  and lower back pain.  No HA, no rash, no toxemia,  no chest pain, no abdominal pain, no N/V/D,  no stroke symptoms   In the ED he had a fever of 103F, tachycardia, dyspneic , back pain, He was  seen in the ED 24 hours earlier for SOB, tactile fever,  back pain and hypoxemia,  He was found to have an  elevated lactate, d-dimer and troponin,    CT angio was negative for central embolus, He was consulted  by cardiology and pulmonary and  symptoms resolved,  the patient was discharged.    PMD Dr Galvez

## 2023-07-10 NOTE — ED ADULT NURSE NOTE - OBJECTIVE STATEMENT
pt BIBA from home accompanied by his wife.  febrile tmax 103 diaphoretic  SOB o2 sat 88% via room air

## 2023-07-10 NOTE — CONSULT NOTE ADULT - SUBJECTIVE AND OBJECTIVE BOX
[facility]         Fady Saeed, Pedro, Rodrick Rosario Savella        [phone] (office)    Reason for Consult: SOB, elevated troponins      HPI:  59 y/o male h/o DM type-2 HTN HLD,  PE/DVT on Xarelto BIBEMS  with shortness of breath, palpitations,  chills  and lower back pain.  No HA, no rash, no toxemia,  no chest pain, no abdominal pain, no N/V/D,  no stroke symptoms. In the ED he had a fever of 103F, tachycardia, dyspneic , back pain, He was  seen in the ED 24 hours earlier for SOB, tactile fever,  back pain and hypoxemia,  He was found to have an elevated lactate, d-dimer and troponin, CT angio was negative for central embolus, He was consulted  by cardiology and pulmonary and  symptoms resolved,  the patient was discharged at that time.   PMD Dr Galvez      PAST MEDICAL & SURGICAL HISTORY:  Type 2 diabetes mellitus      Hyperlipidemia      Hypertension      History of colonoscopy  ()          SOCIAL HISTORY: No active tobacco, alcohol or illicit drug use    FAMILY HISTORY:  Family history of stroke (Father)    Family history of dementia (Mother)    Family history of Alzheimer's disease (Sibling)        Home Medications:  AMLODIPINE   TAB 5MG:  orally once a day (10 Jul 2023 12:05)  ATORVASTATIN TAB 10MG:  orally once a day (at bedtime) (10 Jul 2023 12:05)  dutasteride 0.5 mg oral capsule: 1 orally once a day (10 Jul 2023 12:04)  Flomax 0.4 mg oral capsule: 1 orally once a day (at bedtime) (10 Jul 2023 12:04)  glimepiride 2 mg oral tablet: 1 orally once a day (10 Jul 2023 12:04)  METFORMIN    TAB 500Mmg orally 2 times a day (10 Jul 2023 12:05)  Rybelsus 7 mg oral tablet: 1 orally once a day (10 Jul 2023 12:04)  Xarelto 20 mg oral tablet: 1 orally once a day (10 Jul 2023 12:04)      MEDICATIONS  (STANDING):  amLODIPine   Tablet 5 milliGRAM(s) Oral daily  atorvastatin 10 milliGRAM(s) Oral at bedtime  dextrose 5%. 1000 milliLiter(s) (50 mL/Hr) IV Continuous <Continuous>  dextrose 5%. 1000 milliLiter(s) (100 mL/Hr) IV Continuous <Continuous>  dextrose 50% Injectable 12.5 Gram(s) IV Push once  dextrose 50% Injectable 25 Gram(s) IV Push once  dextrose 50% Injectable 25 Gram(s) IV Push once  finasteride 5 milliGRAM(s) Oral daily  glucagon  Injectable 1 milliGRAM(s) IntraMuscular once  insulin glargine Injectable (LANTUS) 5 Unit(s) SubCutaneous at bedtime  insulin lispro (ADMELOG) corrective regimen sliding scale   SubCutaneous three times a day before meals  insulin lispro Injectable (ADMELOG) 2 Unit(s) SubCutaneous three times a day before meals  rivaroxaban 20 milliGRAM(s) Oral with dinner  tamsulosin 0.4 milliGRAM(s) Oral at bedtime    MEDICATIONS  (PRN):  acetaminophen     Tablet .. 650 milliGRAM(s) Oral every 6 hours PRN Temp greater or equal to 38C (100.4F), Mild Pain (1 - 3)  aluminum hydroxide/magnesium hydroxide/simethicone Suspension 30 milliLiter(s) Oral every 4 hours PRN Dyspepsia  bisacodyl 5 milliGRAM(s) Oral every 12 hours PRN Constipation  dextrose Oral Gel 15 Gram(s) Oral once PRN Blood Glucose LESS THAN 70 milliGRAM(s)/deciliter  HYDROmorphone  Injectable 0.5 milliGRAM(s) IV Push every 4 hours PRN Severe Pain (7 - 10)  melatonin 3 milliGRAM(s) Oral at bedtime PRN Insomnia  ondansetron Injectable 4 milliGRAM(s) IV Push every 8 hours PRN Nausea and/or Vomiting  oxyCODONE    IR 5 milliGRAM(s) Oral every 4 hours PRN Moderate Pain (4 - 6)      Allergies    No Known Allergies    Intolerances        REVIEW OF SYSTEMS: Negative except as per HPI.    VITAL SIGNS:   Vital Signs Last 24 Hrs  T(C): 37.6 (10 Jul 2023 06:35), Max: 39.4 (10 Jul 2023 01:16)  T(F): 99.6 (10 Jul 2023 06:35), Max: 103 (10 Jul 2023 01:16)  HR: 95 (10 Jul 2023 04:55) (95 - 123)  BP: 151/75 (10 Jul 2023 04:55) (151/75 - 174/99)  BP(mean): --  RR: 20 (10 Jul 2023 04:55) (20 - 30)  SpO2: 96% (10 Jul 2023 04:55) (95% - 96%)    Parameters below as of 10 Jul 2023 04:55  Patient On (Oxygen Delivery Method): room air        I&O's Summary      PHYSICAL EXAM:  Constitutional: NAD, well-developed  HEENT NC/AT, moist mucous membranes  Pulmonary: Non-labored, breath sounds are clear bilaterally, no wheezing, rales or rhonchi  Cardiovascular: +S1, S2, RRR, no murmur  Gastrointestinal: Soft, nontender, nondistended, normoactive bowel sounds  Extremities: No peripheral edema   Neurological: Alert, strength and sensitivity are grossly intact  Skin: No obvious lesions/rashes  Psych: Mood & affect appropriate    LABS: All Labs Reviewed:                        14.2   4.06  )-----------( 151      ( 10 Jul 2023 01:30 )             42.0                         14.6   4.79  )-----------( 153      ( 2023 04:10 )             42.6     10 Jul 2023 01:30    136    |  102    |  14     ----------------------------<  219    3.8     |  24     |  1.10   2023 04:10    135    |  102    |  12     ----------------------------<  184    3.2     |  25     |  1.10     Ca    9.6        10 Jul 2023 01:30  Ca    9.4        2023 04:10    TPro  7.1    /  Alb  3.1    /  TBili  0.8    /  DBili  x      /  AST  43     /  ALT  30     /  AlkPhos  63     10 Jul 2023 01:30  TPro  6.8    /  Alb  3.3    /  TBili  1.4    /  DBili  x      /  AST  23     /  ALT  27     /  AlkPhos  55     2023 04:10    PT/INR - ( 10 Jul 2023 01:30 )   PT: 14.1 sec;   INR: 1.20 ratio         PTT - ( 10 Jul 2023 01:30 )  PTT:26.0 sec      Blood Culture: Organism --  Gram Stain Blood -- Gram Stain --  Specimen Source .Blood Blood  Culture-Blood --    Organism --  Gram Stain Blood -- Gram Stain --  Specimen Source Clean Catch Clean Catch (Midstream)  Culture-Blood --    Organism --  Gram Stain Blood -- Gram Stain --  Specimen Source .Blood Blood  Culture-Blood --

## 2023-07-10 NOTE — H&P ADULT - HISTORY OF PRESENT ILLNESS
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 symptoms started on friday, he was evaluated in ED and sent home. it recurred again 7/9 overnight and he returned. He complains of lower back pain in the middle just above his buttocks and is writhing in pain.

## 2023-07-10 NOTE — CONSULT NOTE ADULT - SUBJECTIVE AND OBJECTIVE BOX
Date/Time Patient Seen:  		  Referring MD:   Data Reviewed	       Patient is a 58y old  Male who presents with a chief complaint of     Subjective/HPI  in bed  seen and examined  vs noted  labs reviewed  ct reviewed  alert  verbal  oriented  back pain  hx of VTE - on DOAC  pt with fever - weakness - back pain    seen 2 days ago       · Chief Complaint: The patient is a 58y Male complaining of shortness of breath.  · HPI Objective Statement: 57 y/o male h/o DM type-2 HTN HLD,  PE/DVT on Xarelto   	BIBEMS  with shortness of breath, palpitations,  chills  and lower back pain.  No HA, no rash, no toxemia,  no chest pain, no abdominal pain, no N/V/D,  no stroke symptoms   	In the ED he had a fever of 103F, tachycardia, dyspneic , back pain, He was  seen in the ED 24 hours earlier for SOB, tactile fever,  back pain and hypoxemia,  He was found to have an  elevated lactate, d-dimer and troponin,    CT angio was negative for central embolus, He was consulted  by cardiology and pulmonary and  symptoms resolved,  the patient was discharged.    PMD Dr Galvez    PAST MEDICAL & SURGICAL HISTORY:  Type 2 diabetes mellitus    Hyperlipidemia    Hypertension    History of colonoscopy  ()          Medication list         MEDICATIONS  (STANDING):    MEDICATIONS  (PRN):         Vitals log        ICU Vital Signs Last 24 Hrs  T(C): 37.6 (10 Jul 2023 06:35), Max: 39.4 (10 Jul 2023 01:16)  T(F): 99.6 (10 Jul 2023 06:35), Max: 103 (10 Jul 2023 01:16)  HR: 95 (10 Jul 2023 04:55) (95 - 123)  BP: 151/75 (10 Jul 2023 04:55) (151/75 - 174/99)  BP(mean): --  ABP: --  ABP(mean): --  RR: 20 (10 Jul 2023 04:55) (20 - 30)  SpO2: 96% (10 Jul 2023 04:55) (95% - 96%)    O2 Parameters below as of 10 Jul 2023 04:55  Patient On (Oxygen Delivery Method): room air    PAST MEDICAL HISTORY:  Hyperlipidemia     Hypertension     Type 2 diabetes mellitus.     PAST SURGICAL HISTORY:  History of colonoscopy ().     FAMILY HISTORY:  Father  Still living? No  Family history of stroke, Age at diagnosis: 81-90    Mother  Still living? No  Family history of dementia, Age at diagnosis: Age Unknown    Sibling  Still living? Yes, Estimated age: Age Unknown  Family history of Alzheimer's disease, Age at diagnosis: Age Unknown.     Tobacco Usage:  · Tobacco Usage	Unknown if ever smoked    ALLERGIES AND HOME MEDICATIONS:   Allergies:        Allergies:  	No Known Allergies:     Home Medications:   * Patient Currently Takes Medications as of 2016 12:23 documented in Structured Notes  · 	Pyridium 200 mg oral tablet: 1 tab(s) orally 3 times a day (after meals), As Needed for urinary discomfort  · 	METFORMIN    TAB 500Mmg orally 2 times a day  · 	VALSARTAN    TAB 320MG:  orally once a day (at bedtime)  · 	ATORVASTATIN TAB 10MG:  orally once a day (at bedtime)  · 	AMLODIPINE   TAB 5MG:  orally once a day  · 	multivitamin:   once a day    REVIEW OF SYSTEMS:    Review of Systems:  · CONSTITUTIONAL: - - -  · Constitutional [+]: CHILLS, FEVER  · CARDIOVASCULAR: - - -  · Cardiovascular [+]: PALPITATIONS  · RESPIRATORY: - - -  · Respiratory [+]: SHORTNESS OF BREATH  · MUSCULOSKELETAL: - - -  · Musculoskeletal [+]: BACK PAIN  · ROS STATEMENT: all other ROS negative except as per HPI               Input and Output:  I&O's Detail      Lab Data                        14.2   4.06  )-----------( 151      ( 10 Jul 2023 01:30 )             42.0     07-10    136  |  102  |  14  ----------------------------<  219<H>  3.8   |  24  |  1.10    Ca    9.6      10 Jul 2023 01:30    TPro  7.1  /  Alb  3.1<L>  /  TBili  0.8  /  DBili  x   /  AST  43<H>  /  ALT  30  /  AlkPhos  63  07-10      CARDIAC MARKERS ( 2023 12:40 )  x     / x     / 260 U/L / x     / 2.7 ng/mL        Review of Systems	  fever  dyspnea  weakness  back pain      Objective     Physical Examination    head nc  head at  heart s1s2  lung dec BS  abd soft      Pertinent Lab findings & Imaging      S[place]yer:  NO   Adequate UO     I&O's Detail           Discussed with:     Cultures:	        Radiology    ACC: [id number] EXAM:  CT RENAL STONE HUNT   ORDERED BY: JENELLE STOUT DATE:  07/10/2023          INTERPRETATION:  CLINICAL INFORMATION: Pain and fever    COMPARISON: CT chest 2023.    CONTRAST/COMPLICATIONS:  IV Contrast: NONE  Oral Contrast: NONE  Complications: None reported at time of study completion    PROCEDURE:  CT of the Abdomen and Pelvis was performed.  Sagittal and coronal reformats were performed.    FINDINGS:  LOWER CHEST: There are a few tiny scattered subcentimeter calcified   granulomas. The lungs are otherwise clear.    LIVER: Within normal limits.  BILE DUCTS: Normal caliber.  GALLBLADDER: Within normal limits.  SPLEEN: Within normal limits.  PANCREAS: Within normal limits.  ADRENALS: Within normal limits.  KIDNEYS/URETERS: Within normal limits.    BLADDER: Within normal limits.  REPRODUCTIVE ORGANS: Prostate with mass effect on the urinary bladder.   There are at least one 10 urinary bladder stones measuring up to 1.2 cm.    BOWEL: No bowel obstruction. Appendix is normal.  PERITONEUM: No ascites.  VESSELS: Within normal limits.  RETROPERITONEUM/LYMPH NODES: No lymphadenopathy.  ABDOMINAL WALL: Small fat-containing umbilical hernia.  BONES: Degenerative changes.    IMPRESSION:    Multiple urinary bladder stones. No hydronephrosis or nephrolithiasis.   Enlarged prostate with mass effect on the urinary bladder.      --- End of Report ---            KRZYSZTOF HUSSEIN MD; Attending Radiologist  This document has been electronically signed. Jul 10 2023  5:00AM

## 2023-07-10 NOTE — CONSULT NOTE ADULT - SUBJECTIVE AND OBJECTIVE BOX
Patient is a 58y old  Male who presents with a chief complaint of rigors, back pain (10 Jul 2023 16:57)      HISTORY OF PRESENT ILLNESS:  57 y/o male admitted with rigors, back pain, FUO, w/ h/o BPH, on Flomax and dutasteride as outpatient, sees Dr. Joana Ortega at [facility].  Pt has h/o bilat DVT and PE, sees Dr. Shivam Menard for hematology.  Pt not in urinary retention.  Denies dysuria, flank pain, hematuria.  CT unremarkable for  extract except for enlarged prostate with bladder calculi.    PAST MEDICAL & SURGICAL HISTORY:  Type 2 diabetes mellitus      Hyperlipidemia      Hypertension      History of colonoscopy  ()        MEDICATIONS  (STANDING):  amLODIPine   Tablet 5 milliGRAM(s) Oral daily  atorvastatin 10 milliGRAM(s) Oral at bedtime  dextrose 5%. 1000 milliLiter(s) (50 mL/Hr) IV Continuous <Continuous>  dextrose 5%. 1000 milliLiter(s) (100 mL/Hr) IV Continuous <Continuous>  dextrose 50% Injectable 12.5 Gram(s) IV Push once  dextrose 50% Injectable 25 Gram(s) IV Push once  dextrose 50% Injectable 25 Gram(s) IV Push once  finasteride 5 milliGRAM(s) Oral daily  glucagon  Injectable 1 milliGRAM(s) IntraMuscular once  insulin glargine Injectable (LANTUS) 5 Unit(s) SubCutaneous at bedtime  insulin lispro (ADMELOG) corrective regimen sliding scale   SubCutaneous three times a day before meals  insulin lispro Injectable (ADMELOG) 2 Unit(s) SubCutaneous three times a day before meals  rivaroxaban 20 milliGRAM(s) Oral with dinner  tamsulosin 0.4 milliGRAM(s) Oral at bedtime    MEDICATIONS  (PRN):  acetaminophen     Tablet .. 650 milliGRAM(s) Oral every 6 hours PRN Temp greater or equal to 38C (100.4F), Mild Pain (1 - 3)  aluminum hydroxide/magnesium hydroxide/simethicone Suspension 30 milliLiter(s) Oral every 4 hours PRN Dyspepsia  bisacodyl 5 milliGRAM(s) Oral every 12 hours PRN Constipation  dextrose Oral Gel 15 Gram(s) Oral once PRN Blood Glucose LESS THAN 70 milliGRAM(s)/deciliter  HYDROmorphone  Injectable 0.5 milliGRAM(s) IV Push every 4 hours PRN Severe Pain (7 - 10)  melatonin 3 milliGRAM(s) Oral at bedtime PRN Insomnia  ondansetron Injectable 4 milliGRAM(s) IV Push every 8 hours PRN Nausea and/or Vomiting  oxyCODONE    IR 5 milliGRAM(s) Oral every 4 hours PRN Moderate Pain (4 - 6)      Allergies    No Known Allergies    SOCIAL HISTORY:   Work: [employer] for [employer]      FAMILY HISTORY:  Family history of stroke (Father)    Family history of dementia (Mother)    Family history of Alzheimer's disease (Sibling)        Vital Signs Last 24 Hrs  T(C): 37.2 (10 Jul 2023 17:44), Max: 39.4 (10 Jul 2023 01:16)  T(F): 98.9 (10 Jul 2023 17:44), Max: 103 (10 Jul 2023 01:16)  HR: 95 (10 Jul 2023 17:44) (84 - 123)  BP: 164/87 (10 Jul 2023 17:44) (151/75 - 174/99)  BP(mean): --  RR: 18 (10 Jul 2023 17:44) (16 - 30)  SpO2: 94% (10 Jul 2023 17:44) (93% - 96%)    Parameters below as of 10 Jul 2023 04:55  Patient On (Oxygen Delivery Method): room air        PHYSICAL EXAM:    Constitutional: NAD, obese  HEENT: PERRLA, EOMI  Neck: Supple, full ROM  Back: No CVA tenderness  Respiratory: Normal repiratory effort  Cardiovascular: RRR  Abd: Soft, NT/ND      LABS:                        14.2   4.06  )-----------( 151      ( 10 Jul 2023 01:30 )             42.0     07-10    136  |  102  |  14  ----------------------------<  219<H>  3.8   |  24  |  1.10    Ca    9.6      10 Jul 2023 01:30    TPro  7.1  /  Alb  3.1<L>  /  TBili  0.8  /  DBili  x   /  AST  43<H>  /  ALT  30  /  AlkPhos  63  07-10    PT/INR - ( 10 Jul 2023 01:30 )   PT: 14.1 sec;   INR: 1.20 ratio         PTT - ( 10 Jul 2023 01:30 )  PTT:26.0 sec  Urinalysis Basic - ( 10 Jul 2023 01:30 )    Color: Yellow / Appearance: Clear / S.024 / pH: x  Gluc: 219 mg/dL / Ketone: Trace mg/dL  / Bili: Negative / Urobili: 0.2 mg/dL   Blood: x / Protein: >=1000 mg/dL / Nitrite: Negative   Leuk Esterase: Negative / RBC: 3-5 /HPF / WBC 5-11 /HPF   Sq Epi: x / Non Sq Epi: x / Bacteria: Moderate /HPF      Urine Culture:       RADIOLOGY & ADDITIONAL STUDIES:  < from: CT Renal Stone Hunt (07.10.23 @ 04:46) >    ACC: [id number] EXAM:  CT RENAL STONE HUNT   ORDERED BY: JENELLE DE LA O     PROCEDURE DATE:  07/10/2023          INTERPRETATION:  CLINICAL INFORMATION: Pain and fever    COMPARISON: CT chest 2023.    CONTRAST/COMPLICATIONS:  IV Contrast:NONE  Oral Contrast: NONE  Complications: None reported at time of study completion    PROCEDURE:  CT of the Abdomen and Pelvis was performed.  Sagittal and coronal reformats were performed.    FINDINGS:  LOWER CHEST: There are a few tiny scattered subcentimeter calcified   granulomas. The lungs are otherwise clear.    LIVER: Within normal limits.  BILE DUCTS: Normal caliber.  GALLBLADDER: Within normal limits.  SPLEEN: Within normal limits.  PANCREAS: Within normal limits.  ADRENALS: Within normal limits.  KIDNEYS/URETERS: Within normal limits.    BLADDER: Within normal limits.  REPRODUCTIVE ORGANS: Prostate with mass effect on the urinary bladder.   There are at least one 10 urinary bladder stones measuring up to 1.2 cm.    BOWEL: No bowel obstruction. Appendix is normal.  PERITONEUM: No ascites.  VESSELS: Within normal limits.  RETROPERITONEUM/LYMPH NODES: No lymphadenopathy.  ABDOMINAL WALL: Small fat-containing umbilical hernia.  BONES: Degenerative changes.    IMPRESSION:    Multiple urinary bladder stones. No hydronephrosis or nephrolithiasis.   Enlarged prostate with mass effect on the urinary bladder.    < from: CT Angio Chest PE Protocol w/ IV Cont (23 @ 06:08) >  ACC: [id number] EXAM:  CT ANGIO CHEST PULM ART [facility]   ORDERED BY:  JOANNE SHEPARD     PROCEDURE DATE:  2023          INTERPRETATION:  CLINICAL INFORMATION: Shortness of breath, evaluate for   pulmonary embolism    COMPARISON: None.    CONTRAST/COMPLICATIONS:  IV Contrast: Omnipaque 300  70 cc administered   30 cc discarded  Oral Contrast: NONE  Complications: None reported at time of study completion    PROCEDURE:  CT Angiography of the Chest.  Sagittal and coronal reformats were performed as well as 3D (MIP)   reconstructions.    FINDINGS:    LUNGS AND AIRWAYS: Patent central airways.  Lungs are clear.  PLEURA: No pleural effusion.  MEDIASTINUM AND LILLY: No lymphadenopathy.  VESSELS: Pulmonary arterial opacification is suboptimal. There is no main   pulmonary embolism. Subsegmental branches cannot be assessed. There is no   aortic dissection. The great vessels are not dilated. Atherosclerotic   calcification of the thoracic aorta.  HEART: Heart size is normal. No pericardial effusion.  CHEST WALL AND LOWER NECK: Within normal limits.  VISUALIZED UPPER ABDOMEN: Fatty liver.  BONES: Within normal limits.    IMPRESSION:  No central pulmonary embolism. Subsegmental branches cannot be assessed.    No focal pulmonary opacity or pleural effusion.    < end of copied text >

## 2023-07-10 NOTE — CONSULT NOTE ADULT - ASSESSMENT
59 y/o male h/o DM type-2 HTN HLD, PE/DVT on Xarelto BIBEMS  with shortness of breath.    - Troponin 364.5 -> 1148.3. Continue to trend troponin.  - Order set of cardiac enzymes   - Monitor closely for the development of anginal symptoms or clinical signs of ischemia.   - TTE   - No acute changes on EKG compared to previous.  - No meaningful evidence of volume overload.  - Continue all home medications.  - Monitor and replete lytes, keep K>4, Mg>2.  - All other workup per primary team.  - Will continue to follow.             57 y/o male h/o DM type-2 HTN HLD, PE/DVT on Xarelto BIBEMS  with shortness of breath.    - PT with recurrent fevers and elevated lactate on presentation likely infectious in nature;   - cont ivf  - Appears compensated from HF POV.     - EKG without sig ischemic changes.   - Troponin 364.5 -> 1148.3. Continue to trend troponin. Add CPKS.   - likely more demand related.     - Monitor closely for the development of anginal symptoms or clinical signs of ischemia.   - TTE   - admit to tele  - would add Metoprolol    - Gu eval  - cont statin    - cont AC fo DVT/PE hx   - Monitor and replete lytes, keep K>4, Mg>2.  - All other workup per primary team.  - Will continue to follow.

## 2023-07-10 NOTE — ED ADULT TRIAGE NOTE - CHIEF COMPLAINT QUOTE
per ems from home with shortness of breath and lower back pain. pt was in ed friday. oxygen saturation 86%.

## 2023-07-10 NOTE — CONSULT NOTE ADULT - ASSESSMENT
No acute  issues.  Highly doubt prostatitis.  Continue Flomax, Proscar  outpatient f/u with Dr. Ortega to discuss bladder stone management    Reconsult prn

## 2023-07-10 NOTE — ED ADULT NURSE NOTE - NSFALLUNIVINTERV_ED_ALL_ED
Bed/Stretcher in lowest position, wheels locked, appropriate side rails in place/Call bell, personal items and telephone in reach/Instruct patient to call for assistance before getting out of bed/chair/stretcher/Non-slip footwear applied when patient is off stretcher/[place] to call system/Physically safe environment - no spills, clutter or unnecessary equipment/Purposeful proactive rounding/Room/bathroom lighting operational, light cord in reach

## 2023-07-10 NOTE — H&P ADULT - ASSESSMENT
58m presenting with rigors, high fevers, lower back pain and rising troponin  CT with large prostate and bladder stones  mild pyuria on UA  no leukocytosis  prostatitis?  check psa, crp, procal  ID to consult    rising troponin  cardio to evaluate  continue telemetry  on xarelto  asa    dm  diabetic diet  fingersticks  sliding scale  basal bolus as needed   on weight loss  hold oral meds    BPH  continue flomax and proscar    HTN/HLD  continue norvasc and lipitor

## 2023-07-10 NOTE — CONSULT NOTE ADULT - SUBJECTIVE AND OBJECTIVE BOX
[facility] DIV[facility] of INFECTIOUS DISEASE  Alfred Canela MD PhD, Leila Gamez MD, Mei Adams MD, Garth Mensah MD, Sg Payne MD  and providing coverage with Lizzeth Toledo MD  Providing Infectious Disease Consultations at [facility], [facility], [place], [place], [facility], [facility]'s    Office# [phone] to schedule follow up appointments  Answering Service for urgent calls or New Consults [phone]  Cell# to text for urgent issues Alfred Canela [phone]     HPI:  58M with hx of PE/DVT 2022, DM, HTN, BPH, HLD presents with rigors and sob. Symptoms started on friday, he was evaluated in ED and sent home. it recurred again  overnight and he returned. He complains of lower back pain in the middle just above his buttocks and several transient episodes of dyspnea   (10 Jul 2023 12:09)      PAST MEDICAL & SURGICAL HISTORY:  Type 2 diabetes mellitus  Hyperlipidemia  Hypertension      History of colonoscopy  ()          Antimicrobials      Immunological      Other  acetaminophen     Tablet .. 650 milliGRAM(s) Oral every 6 hours PRN  aluminum hydroxide/magnesium hydroxide/simethicone Suspension 30 milliLiter(s) Oral every 4 hours PRN  amLODIPine   Tablet 5 milliGRAM(s) Oral daily  atorvastatin 10 milliGRAM(s) Oral at bedtime  bisacodyl 5 milliGRAM(s) Oral every 12 hours PRN  dextrose 5%. 1000 milliLiter(s) IV Continuous <Continuous>  dextrose 5%. 1000 milliLiter(s) IV Continuous <Continuous>  dextrose 50% Injectable 12.5 Gram(s) IV Push once  dextrose 50% Injectable 25 Gram(s) IV Push once  dextrose 50% Injectable 25 Gram(s) IV Push once  dextrose Oral Gel 15 Gram(s) Oral once PRN  finasteride 5 milliGRAM(s) Oral daily  glucagon  Injectable 1 milliGRAM(s) IntraMuscular once  HYDROmorphone  Injectable 0.5 milliGRAM(s) IV Push every 4 hours PRN  insulin glargine Injectable (LANTUS) 5 Unit(s) SubCutaneous at bedtime  insulin lispro (ADMELOG) corrective regimen sliding scale   SubCutaneous three times a day before meals  insulin lispro Injectable (ADMELOG) 2 Unit(s) SubCutaneous three times a day before meals  melatonin 3 milliGRAM(s) Oral at bedtime PRN  ondansetron Injectable 4 milliGRAM(s) IV Push every 8 hours PRN  oxyCODONE    IR 5 milliGRAM(s) Oral every 4 hours PRN  rivaroxaban 20 milliGRAM(s) Oral with dinner  tamsulosin 0.4 milliGRAM(s) Oral at bedtime      Allergies    No Known Allergies    Intolerances        SOCIAL HISTORY:  no toxic habits reported      FAMILY HISTORY:  Family history of stroke (Father)  Family history of dementia (Mother)  Family history of Alzheimer's disease (Sibling)        ROS:    EYES:  Negative  blurry vision or double vision  GASTROINTESTINAL:  Negative for nausea, vomiting, diarrhea  -otherwise negative except for subjective    Vital Signs Last 24 Hrs  T(C): 36.6 (10 Jul 2023 14:34), Max: 39.4 (10 Jul 2023 01:16)  T(F): 97.9 (10 Jul 2023 14:34), Max: 103 (10 Jul 2023 01:16)  HR: 91 (10 Jul 2023 14:34) (91 - 123)  BP: 164/80 (10 Jul 2023 14:34) (151/75 - 174/99)  BP(mean): --  RR: 16 (10 Jul 2023 14:34) (16 - 30)  SpO2: 96% (10 Jul 2023 14:34) (95% - 96%)    Parameters below as of 10 Jul 2023 04:55  Patient On (Oxygen Delivery Method): room air        PE:  In no distress  HEENT:  NC, PERRL, sclerae anicteric, conjunctivae clear, EOMI.  Sinuses nontender, no nasal exudate.  No buccal or pharyngeal lesions, erythema or exudate  Neck:  Supple, no adenopathy  Lungs:  No accessory muscle use, bilaterally clear to auscultation  Cor:  distant  Abd:  Symmetric, normoactive BS.  Soft, RUQ is tender, no masses, guarding or rebound.  Liver and spleen not enlarged  Extrem:  No cyanosis or edema  Skin:  No rashes.  Neuro: grossly intact  Musc: moving all limbs freely, no focal deficits, pt tenderness over spine very low back        LABS:                        14.2   4.06  )-----------( 151      ( 10 Jul 2023 01:30 )             42.0       WBC Count: 4.06 K/uL (07-10-23 @ 01:30)  WBC Count: 4.79 K/uL (23 @ 04:10)      07-10    136  |  102  |  14  ----------------------------<  219<H>  3.8   |  24  |  1.10    Ca    9.6      10 Jul 2023 01:30    TPro  7.1  /  Alb  3.1<L>  /  TBili  0.8  /  DBili  x   /  AST  43<H>  /  ALT  30  /  AlkPhos  63  07-10      Creatinine: 1.10 mg/dL (07-10-23 @ 01:30)  Creatinine: 1.10 mg/dL (23 @ 04:10)      Urinalysis Basic - ( 10 Jul 2023 01:30 )    Color: Yellow / Appearance: Clear / S.024 / pH: x  Gluc: 219 mg/dL / Ketone: Trace mg/dL  / Bili: Negative / Urobili: 0.2 mg/dL   Blood: x / Protein: >=1000 mg/dL / Nitrite: Negative   Leuk Esterase: Negative / RBC: 3-5 /HPF / WBC 5-11 /HPF   Sq Epi: x / Non Sq Epi: x / Bacteria: Moderate /HPF              MICROBIOLOGY:      RADIOLOGY & ADDITIONAL STUDIES:    --< from: CT Renal Stone Hunt (07.10.23 @ 04:46) >    ACC: [id number] EXAM:  CT RENAL STONE HUNT   ORDERED BY: JENELLE DE LA O     PROCEDURE DATE:  07/10/2023          INTERPRETATION:  CLINICAL INFORMATION: Pain and fever    COMPARISON: CT chest 2023.    CONTRAST/COMPLICATIONS:  IV Contrast:NONE  Oral Contrast: NONE  Complications: None reported at time of study completion    PROCEDURE:  CT of the Abdomen and Pelvis was performed.  Sagittal and coronal reformats were performed.    FINDINGS:  LOWER CHEST: There are a few tiny scattered subcentimeter calcified   granulomas. The lungs are otherwise clear.    LIVER: Within normal limits.  BILE DUCTS: Normal caliber.  GALLBLADDER: Within normal limits.  SPLEEN: Within normal limits.  PANCREAS: Within normal limits.  ADRENALS: Within normal limits.  KIDNEYS/URETERS: Within normal limits.    BLADDER: Within normal limits.  REPRODUCTIVE ORGANS: Prostate with mass effect on the urinary bladder.   There are at least one 10 urinary bladder stones measuring up to 1.2 cm.    BOWEL: No bowel obstruction. Appendix is normal.  PERITONEUM: No ascites.  VESSELS: Within normal limits.  RETROPERITONEUM/LYMPH NODES: No lymphadenopathy.  ABDOMINAL WALL: Small fat-containing umbilical hernia.  BONES: Degenerative changes.    IMPRESSION:    Multiple urinary bladder stones. No hydronephrosis or nephrolithiasis.   Enlarged prostate with mass effect on the urinary bladder.

## 2023-07-10 NOTE — CONSULT NOTE ADULT - ASSESSMENT
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 Symptoms started on friday, he was evaluated in ED and sent home. it recurred again 7/9 overnight and he returned. He complains of lower back pain in the middle just above his buttocks and several transient episodes of dyspnea    RECOMMENDATIONS  Presentation suggesting an acute infectious process and concerning with point tenderness over spine. Pt is clinically stable and first dose of ceftriaxone given. Will follow regarding continued abx  Recommend evaluation to clarify etiology  -imaging of lumbar sacral spine  -follow up of blood and urine cultures  -will send additional testing    Thank you for consulting us and involving us in the management of this most interesting and challenging case.  We will follow along in the care of this patient. Please call us at [phone] or text me directly on my cell# at [phone] with any concerns.

## 2023-07-10 NOTE — ED PROVIDER NOTE - CLINICAL SUMMARY MEDICAL DECISION MAKING FREE TEXT BOX
shortness of breath, palpitations, fever,  chills  and lower back pain, elevated lactate and troponin, urinary bacteria, IVF, IV Rocephin, CT stone hunt shortness of breath, palpitations, fever,  chills  and lower back pain, elevated lactate and troponin, urinary bacteria, IVF, IV Rocephin, CT stone hunt negative for obstructing stone, medical admit with cards and pul consult

## 2023-07-10 NOTE — PATIENT PROFILE ADULT - NSPROGENPREVTRANSF_GEN_A_NUR
Pt states he had his pulmonary function test at the end of Sept and is calling for results. I advised him that I will call Pulsuzanne Stevenson to request the report as we have not received it. Once received and PCP reviews them, I will call with results and recommendations.  Pt voices understanding and thanks no history of blood product transfusion

## 2023-07-10 NOTE — ED PROVIDER NOTE - CARE PLAN
1 Principal Discharge DX:	Fever  Secondary Diagnosis:	UTI (urinary tract infection)  Secondary Diagnosis:	High serum lactic acid  Secondary Diagnosis:	Elevated troponin   Principal Discharge DX:	Fever  Secondary Diagnosis:	UTI (urinary tract infection)  Secondary Diagnosis:	High serum lactic acid  Secondary Diagnosis:	Elevated troponin  Secondary Diagnosis:	Bladder stones

## 2023-07-10 NOTE — H&P ADULT - NSHPPHYSICALEXAM_GEN_ALL_CORE
PHYSICAL EXAM:  GENERAL: writhing in pain   EYES: conjunctiva and sclera clear  ENMT: Moist mucous membranes  NECK: Supple, No JVD, Normal thyroid  CHEST/LUNG: non labored, cta b/l  HEART: Regular rate and rhythm; No murmurs, rubs, or gallops  ABDOMEN: Soft, Nontender, Nondistended; Bowel sounds present  EXTREMITIES:  2+ Peripheral Pulses, No clubbing, cyanosis, or edema  LYMPH: No lymphadenopathy noted  SKIN: No rashes or lesions

## 2023-07-10 NOTE — CONSULT NOTE ADULT - ATTENDING COMMENTS
He is here with likely an infectious process. cont IVf. lactate downtrending.   trops elevated. EKG without sig ischemic changes.   add cpks.   add bb  cont statin  echo tele  Monitor and replete electrolytes. Keep K>4.0 and Mg>2.0.   Further cardiac workup will depend on clinical course.

## 2023-07-10 NOTE — ED ADULT NURSE NOTE - PRO INTERPRETER NEED 2
I have reviewed the labs and am recommending the following:  A1C NORMAL-The A1c is at goal.  Repeat an a1c in 6 months.     CBC NORMAL-The CBC appears to be stable at this time with no sign of major anemia, abnormal white count or platelet abnormality.  CMP/BMP NORMAL-The electrolytes all appear stable at this time.  This includes kidney functions along with routine electrolytes like sugar, potassium and sodium.  If it was a CMP test, the liver enzymes were noted to be stable also.  LIPID NORMAL SCREEN-The cholesterol panel screening showed levels that are considered at target at this time.  Recheck each year.     Health maintenance items that remain on your list that need to be arranged are listed below.   Please notify me if you are prepared to get them completed.    COVID-19 Vaccine(1) Never done  Pneumococcal Vaccines (Age 0-64)(1 - PCV) Never done  Colorectal Cancer Screening Never done  Foot Exam due on 06/09/2022  Mammogram due on 06/22/2022  Influenza Vaccine(1) due on 09/01/2022    Dr. Josep Baker  
English

## 2023-07-11 LAB
A1C WITH ESTIMATED AVERAGE GLUCOSE RESULT: 8.8 % — HIGH (ref 4–5.6)
ALBUMIN SERPL ELPH-MCNC: 2.9 G/DL — LOW (ref 3.3–5)
ALP SERPL-CCNC: 53 U/L — SIGNIFICANT CHANGE UP (ref 40–120)
ALT FLD-CCNC: 33 U/L — SIGNIFICANT CHANGE UP (ref 12–78)
ANION GAP SERPL CALC-SCNC: 6 MMOL/L — SIGNIFICANT CHANGE UP (ref 5–17)
AST SERPL-CCNC: 45 U/L — HIGH (ref 15–37)
BABESIA MICROTI PCR, BLD RESULT: SIGNIFICANT CHANGE UP
BASOPHILS # BLD AUTO: 0.03 K/UL — SIGNIFICANT CHANGE UP (ref 0–0.2)
BASOPHILS NFR BLD AUTO: 0.3 % — SIGNIFICANT CHANGE UP (ref 0–2)
BILIRUB SERPL-MCNC: 0.7 MG/DL — SIGNIFICANT CHANGE UP (ref 0.2–1.2)
BUN SERPL-MCNC: 14 MG/DL — SIGNIFICANT CHANGE UP (ref 7–23)
CALCIUM SERPL-MCNC: 8.8 MG/DL — SIGNIFICANT CHANGE UP (ref 8.5–10.1)
CHLORIDE SERPL-SCNC: 106 MMOL/L — SIGNIFICANT CHANGE UP (ref 96–108)
CO2 SERPL-SCNC: 28 MMOL/L — SIGNIFICANT CHANGE UP (ref 22–31)
CREAT SERPL-MCNC: 1 MG/DL — SIGNIFICANT CHANGE UP (ref 0.5–1.3)
CULTURE RESULTS: SIGNIFICANT CHANGE UP
EGFR: 87 ML/MIN/1.73M2 — SIGNIFICANT CHANGE UP
EOSINOPHIL # BLD AUTO: 0.01 K/UL — SIGNIFICANT CHANGE UP (ref 0–0.5)
EOSINOPHIL NFR BLD AUTO: 0.1 % — SIGNIFICANT CHANGE UP (ref 0–6)
ERYTHROCYTE [SEDIMENTATION RATE] IN BLOOD: 68 MM/HR — HIGH (ref 0–20)
ESTIMATED AVERAGE GLUCOSE: 206 MG/DL — HIGH (ref 68–114)
GLUCOSE SERPL-MCNC: 193 MG/DL — HIGH (ref 70–99)
HCT VFR BLD CALC: 42.4 % — SIGNIFICANT CHANGE UP (ref 39–50)
HCV AB S/CO SERPL IA: 0.14 S/CO — SIGNIFICANT CHANGE UP (ref 0–0.99)
HCV AB SERPL-IMP: SIGNIFICANT CHANGE UP
HGB BLD-MCNC: 13.8 G/DL — SIGNIFICANT CHANGE UP (ref 13–17)
IMM GRANULOCYTES NFR BLD AUTO: 0.6 % — SIGNIFICANT CHANGE UP (ref 0–0.9)
LYMPHOCYTES # BLD AUTO: 1.8 K/UL — SIGNIFICANT CHANGE UP (ref 1–3.3)
LYMPHOCYTES # BLD AUTO: 19.9 % — SIGNIFICANT CHANGE UP (ref 13–44)
MCHC RBC-ENTMCNC: 28.5 PG — SIGNIFICANT CHANGE UP (ref 27–34)
MCHC RBC-ENTMCNC: 32.5 GM/DL — SIGNIFICANT CHANGE UP (ref 32–36)
MCV RBC AUTO: 87.6 FL — SIGNIFICANT CHANGE UP (ref 80–100)
MONOCYTES # BLD AUTO: 1.13 K/UL — HIGH (ref 0–0.9)
MONOCYTES NFR BLD AUTO: 12.5 % — SIGNIFICANT CHANGE UP (ref 2–14)
NEUTROPHILS # BLD AUTO: 6.04 K/UL — SIGNIFICANT CHANGE UP (ref 1.8–7.4)
NEUTROPHILS NFR BLD AUTO: 66.6 % — SIGNIFICANT CHANGE UP (ref 43–77)
NRBC # BLD: 0 /100 WBCS — SIGNIFICANT CHANGE UP (ref 0–0)
PLATELET # BLD AUTO: 178 K/UL — SIGNIFICANT CHANGE UP (ref 150–400)
POTASSIUM SERPL-MCNC: 4.2 MMOL/L — SIGNIFICANT CHANGE UP (ref 3.5–5.3)
POTASSIUM SERPL-SCNC: 4.2 MMOL/L — SIGNIFICANT CHANGE UP (ref 3.5–5.3)
PROT SERPL-MCNC: 7.2 G/DL — SIGNIFICANT CHANGE UP (ref 6–8.3)
RBC # BLD: 4.84 M/UL — SIGNIFICANT CHANGE UP (ref 4.2–5.8)
RBC # FLD: 12.9 % — SIGNIFICANT CHANGE UP (ref 10.3–14.5)
SODIUM SERPL-SCNC: 140 MMOL/L — SIGNIFICANT CHANGE UP (ref 135–145)
SPECIMEN SOURCE: SIGNIFICANT CHANGE UP
WBC # BLD: 9.06 K/UL — SIGNIFICANT CHANGE UP (ref 3.8–10.5)
WBC # FLD AUTO: 9.06 K/UL — SIGNIFICANT CHANGE UP (ref 3.8–10.5)

## 2023-07-11 PROCEDURE — 99233 SBSQ HOSP IP/OBS HIGH 50: CPT

## 2023-07-11 PROCEDURE — 72132 CT LUMBAR SPINE W/DYE: CPT | Mod: 26

## 2023-07-11 RX ORDER — HYDROMORPHONE HYDROCHLORIDE 2 MG/ML
0.5 INJECTION INTRAMUSCULAR; INTRAVENOUS; SUBCUTANEOUS ONCE
Refills: 0 | Status: DISCONTINUED | OUTPATIENT
Start: 2023-07-11 | End: 2023-07-11

## 2023-07-11 RX ORDER — INSULIN LISPRO 100/ML
3 VIAL (ML) SUBCUTANEOUS
Refills: 0 | Status: DISCONTINUED | OUTPATIENT
Start: 2023-07-11 | End: 2023-07-14

## 2023-07-11 RX ORDER — INSULIN GLARGINE 100 [IU]/ML
8 INJECTION, SOLUTION SUBCUTANEOUS AT BEDTIME
Refills: 0 | Status: DISCONTINUED | OUTPATIENT
Start: 2023-07-11 | End: 2023-07-14

## 2023-07-11 RX ORDER — KETOROLAC TROMETHAMINE 30 MG/ML
30 SYRINGE (ML) INJECTION EVERY 6 HOURS
Refills: 0 | Status: DISCONTINUED | OUTPATIENT
Start: 2023-07-11 | End: 2023-07-14

## 2023-07-11 RX ADMIN — FINASTERIDE 5 MILLIGRAM(S): 5 TABLET, FILM COATED ORAL at 11:50

## 2023-07-11 RX ADMIN — Medication 3 UNIT(S): at 17:21

## 2023-07-11 RX ADMIN — TAMSULOSIN HYDROCHLORIDE 0.4 MILLIGRAM(S): 0.4 CAPSULE ORAL at 21:21

## 2023-07-11 RX ADMIN — Medication 2 UNIT(S): at 08:40

## 2023-07-11 RX ADMIN — RIVAROXABAN 20 MILLIGRAM(S): KIT at 17:21

## 2023-07-11 RX ADMIN — Medication 2: at 12:10

## 2023-07-11 RX ADMIN — Medication 2: at 08:39

## 2023-07-11 RX ADMIN — Medication 650 MILLIGRAM(S): at 02:36

## 2023-07-11 RX ADMIN — HYDROMORPHONE HYDROCHLORIDE 0.5 MILLIGRAM(S): 2 INJECTION INTRAMUSCULAR; INTRAVENOUS; SUBCUTANEOUS at 08:00

## 2023-07-11 RX ADMIN — HYDROMORPHONE HYDROCHLORIDE 0.5 MILLIGRAM(S): 2 INJECTION INTRAMUSCULAR; INTRAVENOUS; SUBCUTANEOUS at 09:22

## 2023-07-11 RX ADMIN — Medication 650 MILLIGRAM(S): at 01:32

## 2023-07-11 RX ADMIN — Medication 3 MILLIGRAM(S): at 21:21

## 2023-07-11 RX ADMIN — Medication 30 MILLIGRAM(S): at 19:55

## 2023-07-11 RX ADMIN — OXYCODONE HYDROCHLORIDE 5 MILLIGRAM(S): 5 TABLET ORAL at 05:56

## 2023-07-11 RX ADMIN — ATORVASTATIN CALCIUM 10 MILLIGRAM(S): 80 TABLET, FILM COATED ORAL at 21:21

## 2023-07-11 RX ADMIN — Medication 30 MILLIGRAM(S): at 20:32

## 2023-07-11 RX ADMIN — Medication 1: at 17:21

## 2023-07-11 RX ADMIN — AMLODIPINE BESYLATE 5 MILLIGRAM(S): 2.5 TABLET ORAL at 05:55

## 2023-07-11 RX ADMIN — INSULIN GLARGINE 8 UNIT(S): 100 INJECTION, SOLUTION SUBCUTANEOUS at 21:21

## 2023-07-11 NOTE — DIETITIAN INITIAL EVALUATION ADULT - NS FNS DIET ORDER
Diet, Consistent Carbohydrate w/Evening Snack:   DASH/TLC {Sodium & Cholesterol Restricted} (07-10-23 @ 09:31)

## 2023-07-11 NOTE — CARE COORDINATION ASSESSMENT. - NSPASTMEDSURGHISTORY_GEN_ALL_CORE_FT
PAST MEDICAL & SURGICAL HISTORY:  Hypertension      Hyperlipidemia      Type 2 diabetes mellitus      History of colonoscopy  (2014)

## 2023-07-11 NOTE — PROGRESS NOTE ADULT - SUBJECTIVE AND OBJECTIVE BOX
[facility] DIV[facility] of INFECTIOUS DISEASE  Alfred Canela MD PhD, Leila Gamez MD, Mei Adams MD, Garth Mensah MD, Sg Payne MD  and providing coverage with Lizzeth Toledo MD  Providing Infectious Disease Consultations at [facility], [facility], [facility]'s    Office# [phone] to schedule follow up appointments  Answering Service for urgent calls or New Consults [phone]  Cell# to text for urgent issues Alfred Canela [phone]     infectious diseases progress note:    GEORGIE CANELA is a 58y y. o. Male patient    Overnight and events of the last 24hrs reviewed    Allergies    No Known Allergies    Intolerances      ANTIBIOTICS/RELEVANT:  antimicrobials    immunologic:    OTHER:  acetaminophen     Tablet .. 650 milliGRAM(s) Oral every 6 hours PRN  aluminum hydroxide/magnesium hydroxide/simethicone Suspension 30 milliLiter(s) Oral every 4 hours PRN  amLODIPine   Tablet 5 milliGRAM(s) Oral daily  atorvastatin 10 milliGRAM(s) Oral at bedtime  bisacodyl 5 milliGRAM(s) Oral every 12 hours PRN  dextrose 5%. 1000 milliLiter(s) IV Continuous <Continuous>  dextrose 5%. 1000 milliLiter(s) IV Continuous <Continuous>  dextrose 50% Injectable 12.5 Gram(s) IV Push once  dextrose 50% Injectable 25 Gram(s) IV Push once  dextrose 50% Injectable 25 Gram(s) IV Push once  dextrose Oral Gel 15 Gram(s) Oral once PRN  finasteride 5 milliGRAM(s) Oral daily  glucagon  Injectable 1 milliGRAM(s) IntraMuscular once  HYDROmorphone  Injectable 0.5 milliGRAM(s) IV Push every 4 hours PRN  insulin glargine Injectable (LANTUS) 5 Unit(s) SubCutaneous at bedtime  insulin lispro (ADMELOG) corrective regimen sliding scale   SubCutaneous three times a day before meals  insulin lispro Injectable (ADMELOG) 2 Unit(s) SubCutaneous three times a day before meals  ketorolac   Injectable 30 milliGRAM(s) IV Push every 6 hours PRN  melatonin 3 milliGRAM(s) Oral at bedtime PRN  ondansetron Injectable 4 milliGRAM(s) IV Push every 8 hours PRN  rivaroxaban 20 milliGRAM(s) Oral with dinner  tamsulosin 0.4 milliGRAM(s) Oral at bedtime      Objective:  Vital Signs Last 24 Hrs  T(C): 38.7 (11 Jul 2023 11:46), Max: 38.7 (11 Jul 2023 11:46)  T(F): 101.7 (11 Jul 2023 11:46), Max: 101.7 (11 Jul 2023 11:46)  HR: 91 (11 Jul 2023 11:46) (81 - 95)  BP: 147/74 (11 Jul 2023 11:46) (143/87 - 167/86)  BP(mean): --  RR: 17 (11 Jul 2023 11:46) (16 - 18)  SpO2: 91% (11 Jul 2023 11:46) (91% - 96%)    Parameters below as of 11 Jul 2023 11:46  Patient On (Oxygen Delivery Method): room air        T(C): 38.7 (07-11-23 @ 11:46), Max: 39.4 (07-10-23 @ 01:16)  T(C): 38.7 (07-11-23 @ 11:46), Max: 39.4 (07-10-23 @ 01:16)  T(C): 38.7 (07-11-23 @ 11:46), Max: 39.4 (07-10-23 @ 01:16)    PHYSICAL EXAM: in obvious resp distress just with sitting up, obese  HEENT: NC atraumatic  Neck: supple  Respiratory: no accessory muscle use, not breathing comfortably, noted wheezing  Cardiovascular: distant  Gastrointestinal: normal appearing, nondistended  Extremities: no clubbing, no cyanosis,        LABS:                          13.8   9.06  )-----------( 178      ( 11 Jul 2023 06:40 )             42.4       WBC  9.06 07-11 @ 06:40  4.06 07-10 @ 01:30  4.79 07-08 @ 04:10      07-11    140  |  106  |  14  ----------------------------<  193<H>  4.2   |  28  |  1.00    Ca    8.8      11 Jul 2023 06:40    TPro  7.2  /  Alb  2.9<L>  /  TBili  0.7  /  DBili  x   /  AST  45<H>  /  ALT  33  /  AlkPhos  53  07-11      Creatinine: 1.00 mg/dL (07-11-23 @ 06:40)  Creatinine: 1.10 mg/dL (07-10-23 @ 01:30)  Creatinine: 1.10 mg/dL (07-08-23 @ 04:10)      PT/INR - ( 10 Jul 2023 01:30 )   PT: 14.1 sec;   INR: 1.20 ratio         PTT - ( 10 Jul 2023 01:30 )  PTT:26.0 sec  Urinalysis Basic - ( 11 Jul 2023 06:40 )    Color: x / Appearance: x / SG: x / pH: x  Gluc: 193 mg/dL / Ketone: x  / Bili: x / Urobili: x   Blood: x / Protein: x / Nitrite: x   Leuk Esterase: x / RBC: x / WBC x   Sq Epi: x / Non Sq Epi: x / Bacteria: x            INFLAMMATORY MARKERS      MICROBIOLOGY:        RADIOLOGY & ADDITIONAL STUDIES:  < from: CT Lumbar Spine w/ IV Cont (07.11.23 @ 08:21) >    ACC: [id number] EXAM:  CT LUMBAR SPINE IC   ORDERED BY: JOSSELYN CHO     PROCEDURE DATE:  07/11/2023          INTERPRETATION:  CT LUMBAR SPINE WITH IV CONTRAST    CLINICAL INFORMATION: sepsis    TECHNIQUE:  Noncontrast CT.  Axial acquisition. Sagittal and coronal reformations.    FINDINGS:  SPINAL COLUMN:  Mild reverse S scoliosis. Straightening of the normal lumbar lordosis.  Mild to moderate degrees of disc space narrowing with associated endplate   degenerative changes, osteophytes, and small Schmorl's nodes.  Vacuum disc phenomenon L1-2 through L4-5.    DISC LEVELS:  T12/L1: Mild disc bulge. Mild facet DJD. No significant canal or neural   foramina narrowing  L1-2: Small to moderate left paracentral disc protrusion and associated   osteophyte. Mild facet DJD. Mild to moderate canal and mild bilateral   neural foramina narrowing  L2-3: Mild to moderate diffuse disc bulge and osteophyte. Mild to   moderate facet and ligamentous degenerative changes. Moderate canal and   bilateral neural foramina narrowing  L3-4: Asymmetric disc bulge and associated osteophytes. Facet DJD.   Moderate canal, and severe bilateral neural foramina narrowing.  L4-5: Moderate asymmetric disc bulge. Moderate facet and ligamentous   degenerative changes. Moderate to severe canal and severe bilateral   neural foramina narrowing.  L5-S1: Small right paracentral disc protrusion and associated osteophyte.   Mild to moderate facet DJD. Mild canal and moderate bilateral neural   foramina narrowing.    OTHER:  Vague lucencies medial aspects of bilateral iliac bones as well as within   the sacrum. These are nonspecific and may be related to osteoporosis.   Correlate with bone scan if warranted.  Mild bilateral perinephric stranding.  Fatty liver    IMPRESSION:  Scoliosis with associated degenerative changes.  No gross discitis, osteomyelitis, or paraspinal abscess.  If discitis/osteomyelitis remains of clinical concern pre and   postcontrast enhanced MRI can be obtained.  Please see report for additional findings.

## 2023-07-11 NOTE — PROGRESS NOTE ADULT - SUBJECTIVE AND OBJECTIVE BOX
[facility] -- Pedro Saeed Pannella, Patel, Savella, Goodger  Office # [phone]    Follow Up:  Elevated CE's    Subjective/Observations: Awake and alert, comfortable on RA.  Denies any form of respiratory or cardiac discomfort.  Denies dysuria but admits to flank pain.  No tele events    REVIEW OF SYSTEMS: All other review of systems is negative unless indicated above  PAST MEDICAL & SURGICAL HISTORY:  Type 2 diabetes mellitus  Hyperlipidemia  Hypertension  History of colonoscopy  (2014)    MEDICATIONS  (STANDING):  amLODIPine   Tablet 5 milliGRAM(s) Oral daily  atorvastatin 10 milliGRAM(s) Oral at bedtime  dextrose 5%. 1000 milliLiter(s) (100 mL/Hr) IV Continuous <Continuous>  dextrose 5%. 1000 milliLiter(s) (50 mL/Hr) IV Continuous <Continuous>  dextrose 50% Injectable 12.5 Gram(s) IV Push once  dextrose 50% Injectable 25 Gram(s) IV Push once  dextrose 50% Injectable 25 Gram(s) IV Push once  finasteride 5 milliGRAM(s) Oral daily  glucagon  Injectable 1 milliGRAM(s) IntraMuscular once  insulin glargine Injectable (LANTUS) 5 Unit(s) SubCutaneous at bedtime  insulin lispro (ADMELOG) corrective regimen sliding scale   SubCutaneous three times a day before meals  insulin lispro Injectable (ADMELOG) 2 Unit(s) SubCutaneous three times a day before meals  rivaroxaban 20 milliGRAM(s) Oral with dinner  tamsulosin 0.4 milliGRAM(s) Oral at bedtime    MEDICATIONS  (PRN):  acetaminophen     Tablet .. 650 milliGRAM(s) Oral every 6 hours PRN Temp greater or equal to 38C (100.4F), Mild Pain (1 - 3)  aluminum hydroxide/magnesium hydroxide/simethicone Suspension 30 milliLiter(s) Oral every 4 hours PRN Dyspepsia  bisacodyl 5 milliGRAM(s) Oral every 12 hours PRN Constipation  dextrose Oral Gel 15 Gram(s) Oral once PRN Blood Glucose LESS THAN 70 milliGRAM(s)/deciliter  HYDROmorphone  Injectable 0.5 milliGRAM(s) IV Push every 4 hours PRN Severe Pain (7 - 10)  melatonin 3 milliGRAM(s) Oral at bedtime PRN Insomnia  ondansetron Injectable 4 milliGRAM(s) IV Push every 8 hours PRN Nausea and/or Vomiting  oxyCODONE    IR 5 milliGRAM(s) Oral every 4 hours PRN Moderate Pain (4 - 6)    Allergies    No Known Allergies    Intolerances    Vital Signs Last 24 Hrs  T(C): 37.2 (10 Jul 2023 17:44), Max: 37.2 (10 Jul 2023 17:44)  T(F): 98.9 (10 Jul 2023 17:44), Max: 98.9 (10 Jul 2023 17:44)  HR: 81 (11 Jul 2023 05:02) (81 - 95)  BP: 143/87 (11 Jul 2023 05:02) (143/87 - 167/86)  BP(mean): --  RR: 18 (11 Jul 2023 05:02) (16 - 18)  SpO2: 93% (11 Jul 2023 05:02) (93% - 96%)    Parameters below as of 11 Jul 2023 05:02  Patient On (Oxygen Delivery Method): room air    I&O's Summary      PHYSICAL EXAM:  TELE: NSR  Constitutional: NAD, awake and alert, obese  HEENT: Moist Mucous Membranes, Anicteric  Pulmonary: Non-labored, breath sounds are clear but diminished bilaterally, No wheezing, rales or rhonchi  Cardiovascular: Regular, S1 and S2, No murmurs, rubs, gallops or clicks  Gastrointestinal: Bowel Sounds present, soft, nontender.   Lymph: No peripheral edema. No lymphadenopathy.  Skin: No visible rashes or ulcers.  Psych:  Mood & affect appropriate  LABS: All Labs Reviewed:                        13.8   9.06  )-----------( 178      ( 11 Jul 2023 06:40 )             42.4                         14.2   4.06  )-----------( 151      ( 10 Jul 2023 01:30 )             42.0     11 Jul 2023 06:40    140    |  106    |  14     ----------------------------<  193    4.2     |  28     |  1.00   10 Jul 2023 01:30    136    |  102    |  14     ----------------------------<  219    3.8     |  24     |  1.10     Ca    8.8        11 Jul 2023 06:40  Ca    9.6        10 Jul 2023 01:30    TPro  7.2    /  Alb  2.9    /  TBili  0.7    /  DBili  x      /  AST  45     /  ALT  33     /  AlkPhos  53     11 Jul 2023 06:40  TPro  7.1    /  Alb  3.1    /  TBili  0.8    /  DBili  x      /  AST  43     /  ALT  30     /  AlkPhos  63     10 Jul 2023 01:30    PT/INR - ( 10 Jul 2023 01:30 )   PT: 14.1 sec;   INR: 1.20 ratio      PTT - ( 10 Jul 2023 01:30 )  PTT:26.0 sec  12 Lead ECG:   Ventricular Rate 93 BPM    Atrial Rate 93 BPM    P-R Interval 134 ms    QRS Duration 84 ms    Q-T Interval 372 ms    QTC Calculation(Bazett) 462 ms    P Axis 30 degrees    R Axis 1 degrees    T Axis 27 degrees    Diagnosis Line Normal sinus rhythm  Normal ECG  When compared with ECG of 10-JUL-2023 01:31, (Unconfirmed)  ST no longer depressed in Anterior leads  Confirmed by CLAUDIA BALES ([id number]) on 7/10/2023 8:58:46 PM (07-10-23 @ 06:35)

## 2023-07-11 NOTE — DIETITIAN INITIAL EVALUATION ADULT - PERTINENT MEDS FT
MEDICATIONS  (STANDING):  amLODIPine   Tablet 5 milliGRAM(s) Oral daily  atorvastatin 10 milliGRAM(s) Oral at bedtime  dextrose 5%. 1000 milliLiter(s) (100 mL/Hr) IV Continuous <Continuous>  dextrose 5%. 1000 milliLiter(s) (50 mL/Hr) IV Continuous <Continuous>  dextrose 50% Injectable 12.5 Gram(s) IV Push once  dextrose 50% Injectable 25 Gram(s) IV Push once  dextrose 50% Injectable 25 Gram(s) IV Push once  finasteride 5 milliGRAM(s) Oral daily  glucagon  Injectable 1 milliGRAM(s) IntraMuscular once  insulin glargine Injectable (LANTUS) 5 Unit(s) SubCutaneous at bedtime  insulin lispro (ADMELOG) corrective regimen sliding scale   SubCutaneous three times a day before meals  insulin lispro Injectable (ADMELOG) 2 Unit(s) SubCutaneous three times a day before meals  rivaroxaban 20 milliGRAM(s) Oral with dinner  tamsulosin 0.4 milliGRAM(s) Oral at bedtime    MEDICATIONS  (PRN):  acetaminophen     Tablet .. 650 milliGRAM(s) Oral every 6 hours PRN Temp greater or equal to 38C (100.4F), Mild Pain (1 - 3)  aluminum hydroxide/magnesium hydroxide/simethicone Suspension 30 milliLiter(s) Oral every 4 hours PRN Dyspepsia  bisacodyl 5 milliGRAM(s) Oral every 12 hours PRN Constipation  dextrose Oral Gel 15 Gram(s) Oral once PRN Blood Glucose LESS THAN 70 milliGRAM(s)/deciliter  HYDROmorphone  Injectable 0.5 milliGRAM(s) IV Push every 4 hours PRN Severe Pain (7 - 10)  melatonin 3 milliGRAM(s) Oral at bedtime PRN Insomnia  ondansetron Injectable 4 milliGRAM(s) IV Push every 8 hours PRN Nausea and/or Vomiting  oxyCODONE    IR 5 milliGRAM(s) Oral every 4 hours PRN Moderate Pain (4 - 6)

## 2023-07-11 NOTE — DIETITIAN INITIAL EVALUATION ADULT - PERTINENT LABORATORY DATA
07-11    140  |  106  |  14  ----------------------------<  193<H>  4.2   |  28  |  1.00    Ca    8.8      11 Jul 2023 06:40    TPro  7.2  /  Alb  2.9<L>  /  TBili  0.7  /  DBili  x   /  AST  45<H>  /  ALT  33  /  AlkPhos  53  07-11  POCT Blood Glucose.: 170 mg/dL (07-10-23 @ 21:47)

## 2023-07-11 NOTE — CARE COORDINATION ASSESSMENT. - NSCAREPROVIDERS_GEN_ALL_CORE_FT
CARE PROVIDERS:  Accepting Physician: Cristino Bland  Administration: Gurdeep Ngo  Administration: Gonzalo Cisneros  Admitting: Cristino Bland  Attending: Cristino Bland  Case Management: Mica Kay  Consultant: Vikas Castellon  Consultant: Rain Goodman  Consultant: Jerome Bryson  Consultant: Laisha Gibbs  Consultant: Osorio Rosario  Consultant: Alfredo Mensah  Consultant: Alfred Canela  Consultant: Ciaran Melgar  ED Attending: Boom Goddard  ED Nurse: Roldan Heller  Nurse: Sammi Herrera  Nurse: Lindsay Farmer  Nurse: Brittany Quinones  Ordered: Physician, Ordering  Ordered: Doctor, Unknown  Ordered: ADM, User  Override: Sammi Herrera  Override: Anu Tucker  Override: Helena Reeves  PCA/Nursing Assistant: Meagan Gutiérrez  Primary Team: Jean Ceballos  Registered Dietitian: Audra Sanabria  Registered Dietitian: Aurelia Cardenas  : Lucinda Johnston

## 2023-07-11 NOTE — PROGRESS NOTE ADULT - ASSESSMENT
58-year-old male with history of diabetes, hypertension, BPH, hyperlipidemia - presents with FEVER - Back Pain - Dyspnea - Weakness    suspect FERNY  Obesity  HTN  BPH  hx of DVT PE  DM  HLD  Nephrolithiasis - Bladder Stones -      and ID and Cardio eval noted  monitored off ABX  VS noted    CT renal stone hunt noted -   Lactic acid trend noted -   trops noted  emp ABX - consideration as per ID recs  ID and Cardio eval  DOAC for VTE - as per home rx regimen  FERNY eval as outpatient - weight management and sleep hygiene review - discussed  monitor VS and HD and Sat  UA - Cx - Biomarkers -   ACAP prn for Fever  DM care - serial FS -   recent visit on 7/8 - CT neg for PE, LE doppler neg for DVT, ABG noted   58-year-old male with history of diabetes, hypertension, BPH, hyperlipidemia - presents with FEVER - Back Pain - Dyspnea - Weakness    suspect FERNY  Obesity  HTN  BPH  hx of DVT PE  DM  HLD  Nephrolithiasis - Bladder Stones -      and ID and Cardio eval noted  monitored off ABX  VS noted  rigors this am   shaking   pain  dilaudid now IVP low dose for pain    CT renal stone hunt noted -   Lactic acid trend noted -   trops noted  emp ABX - consideration as per ID recs  ID and Cardio eval  DOAC for VTE - as per home rx regimen  FERNY eval as outpatient - weight management and sleep hygiene review - discussed  monitor VS and HD and Sat  UA - Cx - Biomarkers -   ACAP prn for Fever  DM care - serial FS -   recent visit on 7/8 - CT neg for PE, LE doppler neg for DVT, ABG noted

## 2023-07-11 NOTE — DIETITIAN INITIAL EVALUATION ADULT - OTHER INFO
58m presenting with rigors, high fevers, lower back pain and rising troponin  CT with large prostate and bladder stones  mild pyuria on UA  no leukocytosis  prostatitis?  check psa, crp, procal  ID to consult    rising troponin  cardio to evaluate  continue telemetry  on xarelto  asa    dm  diabetic diet  fingersticks  sliding scale  basal bolus as needed   on weight loss  hold oral meds    BPH  continue flomax and proscar    HTN/HLD  continue norvasc and lipitor     58m presenting with rigors, high fevers, lower back pain and rising troponin. CT with large prostate and bladder stones .mild pyuria on UA prostatitis?  Pt tells RD during bedside visit this afternoon that he is 5'6" weighs 260#, has been on and off diet his entire adult life ; he loses wt and then regains. He states he is good for a while and then loses willpower. He is currently eating well and offers no nutr related complaints. He was amenable to diet education verbal and written as a reinforcement for knowledge he already has.

## 2023-07-11 NOTE — PROGRESS NOTE ADULT - ASSESSMENT
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 Symptoms started on friday, he was evaluated in ED and sent home. it recurred again 7/9 overnight and he returned. He complains of lower back pain in the middle just above his buttocks and several transient episodes of dyspnea    RECOMMENDATIONS  Presentation suggesting an acute infectious process and concerning with point tenderness over spine. Pt is clinically stable and dose of ceftriaxone given in ER 7/10.   Recommend continued evaluation to clarify etiology  -imaging of lumbar sacral spine noted  -follow up of blood and urine cultures, reordered today with fever spike  -TTE planned  -will send additional testing  -clinically stable to obs off abx as etiology clarified    Thank you for consulting us and involving us in the management of this most interesting and challenging case.  We will follow along in the care of this patient. Please call us at [phone] or text me directly on my cell# at [phone] with any concerns.

## 2023-07-11 NOTE — CARE COORDINATION ASSESSMENT. - OTHER PERTINENT REFERRAL INFORMATION
CM met with patient  at bedside to explain role and transitions of care. Patient lives with wife Terrie and 2 kids (16Y and 21Y) in a private house with 4 steps to get in (with railing) and 10 to the second floor.  Patient was fully independent prior to admission.  No caregiver identified (self), no home care or DMEs.  Wife will transport patient home when ready to be discharge.  No needs identified. Patient  verbalized understanding of plans after discharge without formal services. All questions answered to the best of my abilities.  CM remains available throughout the hospital stay.

## 2023-07-11 NOTE — PROGRESS NOTE ADULT - ASSESSMENT
59 y/o male h/o DM type-2 HTN HLD, PE/DVT on Xarelto BIBEMS  with shortness of breath.    NSTEMI  - He has a known PE/DVT and on Xarelto  - CTPA neg of PE.  SOB can be his anginal equivalent  - CE's elevated and hsT peaked to 1100 and downtrended to 700's  - EKG without sig ischemic changes.  Would obtain EKG for CP   - Follow up TTE  - Plan for cardiac cath, TBD.  This was discussed with patient and he is amenable  - Would start ASA 81 mg and increase statin to 40 mg    - PT with recurrent fevers and elevated lactate on presentation likely infectious in nature.  However, infection w/u negative  - ID following; off Abx.  Lumbar imaging unremarkable  - Can continue IV fluids  - Compensated from HF POV.     -  eval noted  - No further w/u or intervention    - Would hold Xarelto (for DVT/PE) in anticipation of cardiac cath.  This was discussed with Primary, Dr. Bland  - Can start Heparin gtt tonight instead  of Xarelto dosse    - Monitor and replete lytes, keep K>4, Mg>2.  - Will continue to follow.    Laisha Gibbs DNP, NP-C, AGACNP-C  Cardiology   Call TEAMS

## 2023-07-11 NOTE — PROGRESS NOTE ADULT - SUBJECTIVE AND OBJECTIVE BOX
Date/Time Patient Seen:  		  Referring MD:   Data Reviewed	       Patient is a 58y old  Male who presents with a chief complaint of rigors, back pain (10 Jul 2023 18:04)      Subjective/HPI     PAST MEDICAL & SURGICAL HISTORY:  Type 2 diabetes mellitus    Hyperlipidemia    Hypertension    History of colonoscopy  (2014)          Medication list         MEDICATIONS  (STANDING):  amLODIPine   Tablet 5 milliGRAM(s) Oral daily  atorvastatin 10 milliGRAM(s) Oral at bedtime  dextrose 5%. 1000 milliLiter(s) (50 mL/Hr) IV Continuous <Continuous>  dextrose 5%. 1000 milliLiter(s) (100 mL/Hr) IV Continuous <Continuous>  dextrose 50% Injectable 12.5 Gram(s) IV Push once  dextrose 50% Injectable 25 Gram(s) IV Push once  dextrose 50% Injectable 25 Gram(s) IV Push once  finasteride 5 milliGRAM(s) Oral daily  glucagon  Injectable 1 milliGRAM(s) IntraMuscular once  insulin glargine Injectable (LANTUS) 5 Unit(s) SubCutaneous at bedtime  insulin lispro (ADMELOG) corrective regimen sliding scale   SubCutaneous three times a day before meals  insulin lispro Injectable (ADMELOG) 2 Unit(s) SubCutaneous three times a day before meals  rivaroxaban 20 milliGRAM(s) Oral with dinner  tamsulosin 0.4 milliGRAM(s) Oral at bedtime    MEDICATIONS  (PRN):  acetaminophen     Tablet .. 650 milliGRAM(s) Oral every 6 hours PRN Temp greater or equal to 38C (100.4F), Mild Pain (1 - 3)  aluminum hydroxide/magnesium hydroxide/simethicone Suspension 30 milliLiter(s) Oral every 4 hours PRN Dyspepsia  bisacodyl 5 milliGRAM(s) Oral every 12 hours PRN Constipation  dextrose Oral Gel 15 Gram(s) Oral once PRN Blood Glucose LESS THAN 70 milliGRAM(s)/deciliter  HYDROmorphone  Injectable 0.5 milliGRAM(s) IV Push every 4 hours PRN Severe Pain (7 - 10)  melatonin 3 milliGRAM(s) Oral at bedtime PRN Insomnia  ondansetron Injectable 4 milliGRAM(s) IV Push every 8 hours PRN Nausea and/or Vomiting  oxyCODONE    IR 5 milliGRAM(s) Oral every 4 hours PRN Moderate Pain (4 - 6)         Vitals log        ICU Vital Signs Last 24 Hrs  T(C): 37.2 (10 Jul 2023 17:44), Max: 37.6 (10 Jul 2023 06:35)  T(F): 98.9 (10 Jul 2023 17:44), Max: 99.6 (10 Jul 2023 06:35)  HR: 81 (11 Jul 2023 05:02) (81 - 95)  BP: 143/87 (11 Jul 2023 05:02) (143/87 - 167/86)  BP(mean): --  ABP: --  ABP(mean): --  RR: 18 (11 Jul 2023 05:02) (16 - 18)  SpO2: 93% (11 Jul 2023 05:02) (93% - 96%)    O2 Parameters below as of 11 Jul 2023 05:02  Patient On (Oxygen Delivery Method): room air                 Input and Output:  I&O's Detail      Lab Data                        14.2   4.06  )-----------( 151      ( 10 Jul 2023 01:30 )             42.0     07-10    136  |  102  |  14  ----------------------------<  219<H>  3.8   |  24  |  1.10    Ca    9.6      10 Jul 2023 01:30    TPro  7.1  /  Alb  3.1<L>  /  TBili  0.8  /  DBili  x   /  AST  43<H>  /  ALT  30  /  AlkPhos  63  07-10            Review of Systems	      Objective     Physical Examination    heart s1s2  lung dec BS  head nc      Pertinent Lab findings & Imaging      Silverio:  NO   Adequate UO     I&O's Detail           Discussed with:     Cultures:	        Radiology

## 2023-07-11 NOTE — PROGRESS NOTE ADULT - SUBJECTIVE AND OBJECTIVE BOX
Patient is a 58y old  Male who presents with a chief complaint of rigors, back pain (11 Jul 2023 10:15)        INTERVAL HPI/OVERNIGHT EVENTS:   pain continues  pt seen and examined         Vital Signs Last 24 Hrs  T(C): 37.2 (10 Jul 2023 17:44), Max: 37.2 (10 Jul 2023 17:44)  T(F): 98.9 (10 Jul 2023 17:44), Max: 98.9 (10 Jul 2023 17:44)  HR: 81 (11 Jul 2023 05:02) (81 - 95)  BP: 143/87 (11 Jul 2023 05:02) (143/87 - 167/86)  BP(mean): --  RR: 18 (11 Jul 2023 05:02) (16 - 18)  SpO2: 93% (11 Jul 2023 05:02) (93% - 96%)    Parameters below as of 11 Jul 2023 05:02  Patient On (Oxygen Delivery Method): room air        acetaminophen     Tablet .. 650 milliGRAM(s) Oral every 6 hours PRN  aluminum hydroxide/magnesium hydroxide/simethicone Suspension 30 milliLiter(s) Oral every 4 hours PRN  amLODIPine   Tablet 5 milliGRAM(s) Oral daily  atorvastatin 10 milliGRAM(s) Oral at bedtime  bisacodyl 5 milliGRAM(s) Oral every 12 hours PRN  dextrose 5%. 1000 milliLiter(s) IV Continuous <Continuous>  dextrose 5%. 1000 milliLiter(s) IV Continuous <Continuous>  dextrose 50% Injectable 12.5 Gram(s) IV Push once  dextrose 50% Injectable 25 Gram(s) IV Push once  dextrose 50% Injectable 25 Gram(s) IV Push once  dextrose Oral Gel 15 Gram(s) Oral once PRN  finasteride 5 milliGRAM(s) Oral daily  glucagon  Injectable 1 milliGRAM(s) IntraMuscular once  HYDROmorphone  Injectable 0.5 milliGRAM(s) IV Push every 4 hours PRN  insulin glargine Injectable (LANTUS) 5 Unit(s) SubCutaneous at bedtime  insulin lispro (ADMELOG) corrective regimen sliding scale   SubCutaneous three times a day before meals  insulin lispro Injectable (ADMELOG) 2 Unit(s) SubCutaneous three times a day before meals  melatonin 3 milliGRAM(s) Oral at bedtime PRN  ondansetron Injectable 4 milliGRAM(s) IV Push every 8 hours PRN  oxyCODONE    IR 5 milliGRAM(s) Oral every 4 hours PRN  rivaroxaban 20 milliGRAM(s) Oral with dinner  tamsulosin 0.4 milliGRAM(s) Oral at bedtime      PHYSICAL EXAM:  GENERAL: NAD   EYES: conjunctiva and sclera clear  ENMT: Moist mucous membranes  NECK: Supple, No JVD, Normal thyroid  CHEST/LUNG: non labored, cta b/l  HEART: Regular rate and rhythm; No murmurs, rubs, or gallops  ABDOMEN: Soft, Nontender, Nondistended; Bowel sounds present  EXTREMITIES:  2+ Peripheral Pulses, No clubbing, cyanosis, or edema  LYMPH: No lymphadenopathy noted  SKIN: No rashes or lesions    Consultant(s) Notes Reviewed:  [x ] YES  [ ] NO  Care Discussed with Consultants/Other Providers [ x] YES  [ ] NO    LABS:                        13.8   9.06  )-----------( 178      ( 11 Jul 2023 06:40 )             42.4     07-11    140  |  106  |  14  ----------------------------<  193<H>  4.2   |  28  |  1.00    Ca    8.8      11 Jul 2023 06:40    TPro  7.2  /  Alb  2.9<L>  /  TBili  0.7  /  DBili  x   /  AST  45<H>  /  ALT  33  /  AlkPhos  53  07-11    PT/INR - ( 10 Jul 2023 01:30 )   PT: 14.1 sec;   INR: 1.20 ratio         PTT - ( 10 Jul 2023 01:30 )  PTT:26.0 sec  Urinalysis Basic - ( 11 Jul 2023 06:40 )    Color: x / Appearance: x / SG: x / pH: x  Gluc: 193 mg/dL / Ketone: x  / Bili: x / Urobili: x   Blood: x / Protein: x / Nitrite: x   Leuk Esterase: x / RBC: x / WBC x   Sq Epi: x / Non Sq Epi: x / Bacteria: x      CAPILLARY BLOOD GLUCOSE      POCT Blood Glucose.: 209 mg/dL (11 Jul 2023 08:31)  POCT Blood Glucose.: 170 mg/dL (10 Jul 2023 21:47)  POCT Blood Glucose.: 167 mg/dL (10 Jul 2023 17:14)  POCT Blood Glucose.: 243 mg/dL (10 Jul 2023 12:45)        Urinalysis Basic - ( 11 Jul 2023 06:40 )    Color: x / Appearance: x / SG: x / pH: x  Gluc: 193 mg/dL / Ketone: x  / Bili: x / Urobili: x   Blood: x / Protein: x / Nitrite: x   Leuk Esterase: x / RBC: x / WBC x   Sq Epi: x / Non Sq Epi: x / Bacteria: x        Culture - Blood (collected 10 Jul 2023 01:40)  Source: .Blood Blood-Peripheral  Preliminary Report (11 Jul 2023 07:02):    No growth at 24 hours    Culture - Blood (collected 10 Jul 2023 01:30)  Source: .Blood Blood-Peripheral  Preliminary Report (11 Jul 2023 07:02):    No growth at 24 hours    Culture - Urine (collected 10 Jul 2023 01:30)  Source: Clean Catch Clean Catch (Midstream)  Final Report (11 Jul 2023 07:13):    <10,000 CFU/mL Normal Urogenital Jessi        RADIOLOGY & ADDITIONAL TESTS:    Imaging Personally Reviewed  Reviewed consultants input

## 2023-07-11 NOTE — PROGRESS NOTE ADULT - ASSESSMENT
58m presenting with rigors, high fevers, lower back pain and rising troponin  CT with large prostate and bladder stones  mild pyuria on UA  no leukocytosis  radiculopathy?  PSA, crp, and procal markedly elevated unclear significance  ID, pulm and urology following  no clear source of infection  cultures NGTD    rising troponin  cardio following  continue telemetry  switch to heparin  asa  eventual cardiac cath    dm  diabetic diet  fingersticks  sliding scale  basal bolus as needed   on weight loss  hold oral meds    BPH  continue flomax and proscar    HTN/HLD  continue norvasc and lipitor

## 2023-07-12 LAB
ALBUMIN SERPL ELPH-MCNC: 2.7 G/DL — LOW (ref 3.3–5)
ALP SERPL-CCNC: 53 U/L — SIGNIFICANT CHANGE UP (ref 40–120)
ALT FLD-CCNC: 30 U/L — SIGNIFICANT CHANGE UP (ref 12–78)
ANION GAP SERPL CALC-SCNC: 5 MMOL/L — SIGNIFICANT CHANGE UP (ref 5–17)
APTT BLD: 29.7 SEC — SIGNIFICANT CHANGE UP (ref 27.5–35.5)
AST SERPL-CCNC: 35 U/L — SIGNIFICANT CHANGE UP (ref 15–37)
BASOPHILS # BLD AUTO: 0.04 K/UL — SIGNIFICANT CHANGE UP (ref 0–0.2)
BASOPHILS NFR BLD AUTO: 0.5 % — SIGNIFICANT CHANGE UP (ref 0–2)
BILIRUB SERPL-MCNC: 0.7 MG/DL — SIGNIFICANT CHANGE UP (ref 0.2–1.2)
BUN SERPL-MCNC: 16 MG/DL — SIGNIFICANT CHANGE UP (ref 7–23)
CALCIUM SERPL-MCNC: 8.9 MG/DL — SIGNIFICANT CHANGE UP (ref 8.5–10.1)
CHLORIDE SERPL-SCNC: 102 MMOL/L — SIGNIFICANT CHANGE UP (ref 96–108)
CO2 SERPL-SCNC: 30 MMOL/L — SIGNIFICANT CHANGE UP (ref 22–31)
CREAT SERPL-MCNC: 0.89 MG/DL — SIGNIFICANT CHANGE UP (ref 0.5–1.3)
CRP SERPL-MCNC: 127 MG/L — HIGH
EGFR: 99 ML/MIN/1.73M2 — SIGNIFICANT CHANGE UP
EOSINOPHIL # BLD AUTO: 0.06 K/UL — SIGNIFICANT CHANGE UP (ref 0–0.5)
EOSINOPHIL NFR BLD AUTO: 0.7 % — SIGNIFICANT CHANGE UP (ref 0–6)
GLUCOSE SERPL-MCNC: 209 MG/DL — HIGH (ref 70–99)
HCT VFR BLD CALC: 38.7 % — LOW (ref 39–50)
HGB BLD-MCNC: 12.9 G/DL — LOW (ref 13–17)
IMM GRANULOCYTES NFR BLD AUTO: 0.9 % — SIGNIFICANT CHANGE UP (ref 0–0.9)
LYMPHOCYTES # BLD AUTO: 1.29 K/UL — SIGNIFICANT CHANGE UP (ref 1–3.3)
LYMPHOCYTES # BLD AUTO: 15.1 % — SIGNIFICANT CHANGE UP (ref 13–44)
MCHC RBC-ENTMCNC: 28.7 PG — SIGNIFICANT CHANGE UP (ref 27–34)
MCHC RBC-ENTMCNC: 33.3 GM/DL — SIGNIFICANT CHANGE UP (ref 32–36)
MCV RBC AUTO: 86.2 FL — SIGNIFICANT CHANGE UP (ref 80–100)
MONOCYTES # BLD AUTO: 1.17 K/UL — HIGH (ref 0–0.9)
MONOCYTES NFR BLD AUTO: 13.7 % — SIGNIFICANT CHANGE UP (ref 2–14)
NEUTROPHILS # BLD AUTO: 5.92 K/UL — SIGNIFICANT CHANGE UP (ref 1.8–7.4)
NEUTROPHILS NFR BLD AUTO: 69.1 % — SIGNIFICANT CHANGE UP (ref 43–77)
NRBC # BLD: 0 /100 WBCS — SIGNIFICANT CHANGE UP (ref 0–0)
PLATELET # BLD AUTO: 207 K/UL — SIGNIFICANT CHANGE UP (ref 150–400)
POTASSIUM SERPL-MCNC: 3.7 MMOL/L — SIGNIFICANT CHANGE UP (ref 3.5–5.3)
POTASSIUM SERPL-SCNC: 3.7 MMOL/L — SIGNIFICANT CHANGE UP (ref 3.5–5.3)
PROT SERPL-MCNC: 6.9 G/DL — SIGNIFICANT CHANGE UP (ref 6–8.3)
RBC # BLD: 4.49 M/UL — SIGNIFICANT CHANGE UP (ref 4.2–5.8)
RBC # FLD: 12.7 % — SIGNIFICANT CHANGE UP (ref 10.3–14.5)
SODIUM SERPL-SCNC: 137 MMOL/L — SIGNIFICANT CHANGE UP (ref 135–145)
WBC # BLD: 8.56 K/UL — SIGNIFICANT CHANGE UP (ref 3.8–10.5)
WBC # FLD AUTO: 8.56 K/UL — SIGNIFICANT CHANGE UP (ref 3.8–10.5)

## 2023-07-12 PROCEDURE — 99233 SBSQ HOSP IP/OBS HIGH 50: CPT

## 2023-07-12 PROCEDURE — 93306 TTE W/DOPPLER COMPLETE: CPT | Mod: 26

## 2023-07-12 RX ORDER — ASPIRIN/CALCIUM CARB/MAGNESIUM 324 MG
81 TABLET ORAL DAILY
Refills: 0 | Status: DISCONTINUED | OUTPATIENT
Start: 2023-07-12 | End: 2023-07-14

## 2023-07-12 RX ORDER — HEPARIN SODIUM 5000 [USP'U]/ML
4500 INJECTION INTRAVENOUS; SUBCUTANEOUS EVERY 6 HOURS
Refills: 0 | Status: DISCONTINUED | OUTPATIENT
Start: 2023-07-12 | End: 2023-07-13

## 2023-07-12 RX ORDER — HEPARIN SODIUM 5000 [USP'U]/ML
9500 INJECTION INTRAVENOUS; SUBCUTANEOUS EVERY 6 HOURS
Refills: 0 | Status: DISCONTINUED | OUTPATIENT
Start: 2023-07-12 | End: 2023-07-13

## 2023-07-12 RX ORDER — ATORVASTATIN CALCIUM 80 MG/1
40 TABLET, FILM COATED ORAL AT BEDTIME
Refills: 0 | Status: DISCONTINUED | OUTPATIENT
Start: 2023-07-12 | End: 2023-07-14

## 2023-07-12 RX ORDER — POTASSIUM CHLORIDE 20 MEQ
40 PACKET (EA) ORAL ONCE
Refills: 0 | Status: COMPLETED | OUTPATIENT
Start: 2023-07-12 | End: 2023-07-12

## 2023-07-12 RX ORDER — HEPARIN SODIUM 5000 [USP'U]/ML
INJECTION INTRAVENOUS; SUBCUTANEOUS
Qty: 25000 | Refills: 0 | Status: DISCONTINUED | OUTPATIENT
Start: 2023-07-12 | End: 2023-07-13

## 2023-07-12 RX ADMIN — Medication 81 MILLIGRAM(S): at 13:21

## 2023-07-12 RX ADMIN — ATORVASTATIN CALCIUM 40 MILLIGRAM(S): 80 TABLET, FILM COATED ORAL at 21:04

## 2023-07-12 RX ADMIN — Medication 3 UNIT(S): at 09:08

## 2023-07-12 RX ADMIN — Medication 30 MILLIGRAM(S): at 06:39

## 2023-07-12 RX ADMIN — Medication 2: at 13:24

## 2023-07-12 RX ADMIN — AMLODIPINE BESYLATE 5 MILLIGRAM(S): 2.5 TABLET ORAL at 05:29

## 2023-07-12 RX ADMIN — HEPARIN SODIUM 2100 UNIT(S)/HR: 5000 INJECTION INTRAVENOUS; SUBCUTANEOUS at 19:33

## 2023-07-12 RX ADMIN — FINASTERIDE 5 MILLIGRAM(S): 5 TABLET, FILM COATED ORAL at 13:21

## 2023-07-12 RX ADMIN — TAMSULOSIN HYDROCHLORIDE 0.4 MILLIGRAM(S): 0.4 CAPSULE ORAL at 21:05

## 2023-07-12 RX ADMIN — Medication 40 MILLIEQUIVALENT(S): at 18:42

## 2023-07-12 RX ADMIN — Medication 1: at 09:08

## 2023-07-12 RX ADMIN — Medication 3 UNIT(S): at 17:05

## 2023-07-12 RX ADMIN — INSULIN GLARGINE 8 UNIT(S): 100 INJECTION, SOLUTION SUBCUTANEOUS at 21:48

## 2023-07-12 RX ADMIN — Medication 30 MILLIGRAM(S): at 05:29

## 2023-07-12 RX ADMIN — Medication 3 UNIT(S): at 13:25

## 2023-07-12 RX ADMIN — Medication 30 MILLIGRAM(S): at 17:04

## 2023-07-12 NOTE — PROGRESS NOTE ADULT - SUBJECTIVE AND OBJECTIVE BOX
Date/Time Patient Seen:  		  Referring MD:   Data Reviewed	       Patient is a 58y old  Male who presents with a chief complaint of rigors, back pain (11 Jul 2023 11:46)      Subjective/HPI     PAST MEDICAL & SURGICAL HISTORY:  Type 2 diabetes mellitus    Hyperlipidemia    Hypertension    History of colonoscopy  (2014)          Medication list         MEDICATIONS  (STANDING):  amLODIPine   Tablet 5 milliGRAM(s) Oral daily  atorvastatin 10 milliGRAM(s) Oral at bedtime  dextrose 5%. 1000 milliLiter(s) (50 mL/Hr) IV Continuous <Continuous>  dextrose 5%. 1000 milliLiter(s) (100 mL/Hr) IV Continuous <Continuous>  dextrose 50% Injectable 12.5 Gram(s) IV Push once  dextrose 50% Injectable 25 Gram(s) IV Push once  dextrose 50% Injectable 25 Gram(s) IV Push once  finasteride 5 milliGRAM(s) Oral daily  glucagon  Injectable 1 milliGRAM(s) IntraMuscular once  insulin glargine Injectable (LANTUS) 8 Unit(s) SubCutaneous at bedtime  insulin lispro (ADMELOG) corrective regimen sliding scale   SubCutaneous three times a day before meals  insulin lispro Injectable (ADMELOG) 3 Unit(s) SubCutaneous three times a day before meals  rivaroxaban 20 milliGRAM(s) Oral with dinner  tamsulosin 0.4 milliGRAM(s) Oral at bedtime    MEDICATIONS  (PRN):  acetaminophen     Tablet .. 650 milliGRAM(s) Oral every 6 hours PRN Temp greater or equal to 38C (100.4F), Mild Pain (1 - 3)  aluminum hydroxide/magnesium hydroxide/simethicone Suspension 30 milliLiter(s) Oral every 4 hours PRN Dyspepsia  bisacodyl 5 milliGRAM(s) Oral every 12 hours PRN Constipation  dextrose Oral Gel 15 Gram(s) Oral once PRN Blood Glucose LESS THAN 70 milliGRAM(s)/deciliter  HYDROmorphone  Injectable 0.5 milliGRAM(s) IV Push every 4 hours PRN Severe Pain (7 - 10)  ketorolac   Injectable 30 milliGRAM(s) IV Push every 6 hours PRN Moderate Pain (4 - 6)  melatonin 3 milliGRAM(s) Oral at bedtime PRN Insomnia  ondansetron Injectable 4 milliGRAM(s) IV Push every 8 hours PRN Nausea and/or Vomiting         Vitals log        ICU Vital Signs Last 24 Hrs  T(C): 36.4 (12 Jul 2023 04:45), Max: 38.7 (11 Jul 2023 11:46)  T(F): 97.6 (12 Jul 2023 04:45), Max: 101.7 (11 Jul 2023 11:46)  HR: 73 (12 Jul 2023 04:45) (73 - 95)  BP: 157/77 (12 Jul 2023 04:45) (147/74 - 157/77)  BP(mean): --  ABP: --  ABP(mean): --  RR: 18 (12 Jul 2023 04:45) (17 - 18)  SpO2: 96% (12 Jul 2023 04:45) (91% - 96%)    O2 Parameters below as of 12 Jul 2023 04:45  Patient On (Oxygen Delivery Method): room air                 Input and Output:  I&O's Detail      Lab Data                        13.8   9.06  )-----------( 178      ( 11 Jul 2023 06:40 )             42.4     07-11    140  |  106  |  14  ----------------------------<  193<H>  4.2   |  28  |  1.00    Ca    8.8      11 Jul 2023 06:40    TPro  7.2  /  Alb  2.9<L>  /  TBili  0.7  /  DBili  x   /  AST  45<H>  /  ALT  33  /  AlkPhos  53  07-11            Review of Systems	      Objective     Physical Examination    heart s1s2  lung dc BS  head nc  head at      Pertinent Lab findings & Imaging      Silverio:  NO   Adequate UO     I&O's Detail           Discussed with:     Cultures:	        Radiology

## 2023-07-12 NOTE — PROGRESS NOTE ADULT - SUBJECTIVE AND OBJECTIVE BOX
[facility] -- Pedro Saeed Pannella, Patel, Savella, Goodger  Office # [phone]    Follow Up:  Elevated CE's    Subjective/Observations: Still c/o lower back pain.  Denies any form of respiratory or cardiac discomfort.  Tolerating RA.  No tele events    REVIEW OF SYSTEMS: All other review of systems is negative unless indicated above  PAST MEDICAL & SURGICAL HISTORY:  Type 2 diabetes mellitus  Hyperlipidemi  Hypertension  History of colonoscopy  (2014)    MEDICATIONS  (STANDING):  amLODIPine   Tablet 5 milliGRAM(s) Oral daily  atorvastatin 10 milliGRAM(s) Oral at bedtime  dextrose 5%. 1000 milliLiter(s) (50 mL/Hr) IV Continuous <Continuous>  dextrose 5%. 1000 milliLiter(s) (100 mL/Hr) IV Continuous <Continuous>  dextrose 50% Injectable 25 Gram(s) IV Push once  dextrose 50% Injectable 12.5 Gram(s) IV Push once  dextrose 50% Injectable 25 Gram(s) IV Push once  finasteride 5 milliGRAM(s) Oral daily  glucagon  Injectable 1 milliGRAM(s) IntraMuscular once  insulin glargine Injectable (LANTUS) 8 Unit(s) SubCutaneous at bedtime  insulin lispro (ADMELOG) corrective regimen sliding scale   SubCutaneous three times a day before meals  insulin lispro Injectable (ADMELOG) 3 Unit(s) SubCutaneous three times a day before meals  tamsulosin 0.4 milliGRAM(s) Oral at bedtime    MEDICATIONS  (PRN):  acetaminophen     Tablet .. 650 milliGRAM(s) Oral every 6 hours PRN Temp greater or equal to 38C (100.4F), Mild Pain (1 - 3)  aluminum hydroxide/magnesium hydroxide/simethicone Suspension 30 milliLiter(s) Oral every 4 hours PRN Dyspepsia  bisacodyl 5 milliGRAM(s) Oral every 12 hours PRN Constipation  dextrose Oral Gel 15 Gram(s) Oral once PRN Blood Glucose LESS THAN 70 milliGRAM(s)/deciliter  HYDROmorphone  Injectable 0.5 milliGRAM(s) IV Push every 4 hours PRN Severe Pain (7 - 10)  ketorolac   Injectable 30 milliGRAM(s) IV Push every 6 hours PRN Moderate Pain (4 - 6)  melatonin 3 milliGRAM(s) Oral at bedtime PRN Insomnia  ondansetron Injectable 4 milliGRAM(s) IV Push every 8 hours PRN Nausea and/or Vomiting    Allergies    No Known Allergies    Intolerances  Vital Signs Last 24 Hrs  T(C): 36.4 (12 Jul 2023 04:45), Max: 38.7 (11 Jul 2023 11:46)  T(F): 97.6 (12 Jul 2023 04:45), Max: 101.7 (11 Jul 2023 11:46)  HR: 73 (12 Jul 2023 04:45) (73 - 95)  BP: 157/77 (12 Jul 2023 04:45) (147/74 - 157/77)  BP(mean): --  RR: 18 (12 Jul 2023 04:45) (17 - 18)  SpO2: 96% (12 Jul 2023 04:45) (91% - 96%)    Parameters below as of 12 Jul 2023 04:45  Patient On (Oxygen Delivery Method): room air  I&O's Summary    PHYSICAL EXAM:  TELE: NSR  Constitutional: NAD, awake and alert, obese  HEENT: Moist Mucous Membranes, Anicteric  Pulmonary: Non-labored, breath sounds are clear but diminished bilaterally, No wheezing, rales or rhonchi  Cardiovascular: Regular, S1 and S2, No murmurs, rubs, gallops or clicks  Gastrointestinal: Bowel Sounds present, soft, nontender.  Softly distended  Lymph: No peripheral edema. No lymphadenopathy.  Skin: No visible rashes or ulcers.  Psych:  Mood & affect: anxious     LABS: All Labs Reviewed:                        12.9   8.56  )-----------( 207      ( 12 Jul 2023 08:45 )             38.7                         13.8   9.06  )-----------( 178      ( 11 Jul 2023 06:40 )             42.4                         14.2   4.06  )-----------( 151      ( 10 Jul 2023 01:30 )             42.0     12 Jul 2023 08:45    137    |  102    |  16     ----------------------------<  209    3.7     |  30     |  0.89   11 Jul 2023 06:40    140    |  106    |  14     ----------------------------<  193    4.2     |  28     |  1.00   10 Jul 2023 01:30    136    |  102    |  14     ----------------------------<  219    3.8     |  24     |  1.10     Ca    8.9        12 Jul 2023 08:45  Ca    8.8        11 Jul 2023 06:40  Ca    9.6        10 Jul 2023 01:30    TPro  6.9    /  Alb  2.7    /  TBili  0.7    /  DBili  x      /  AST  35     /  ALT  30     /  AlkPhos  53     12 Jul 2023 08:45  TPro  7.2    /  Alb  2.9    /  TBili  0.7    /  DBili  x      /  AST  45     /  ALT  33     /  AlkPhos  53     11 Jul 2023 06:40  TPro  7.1    /  Alb  3.1    /  TBili  0.8    /  DBili  x      /  AST  43     /  ALT  30     /  AlkPhos  63     10 Jul 2023 01:30    12 Lead ECG:   Ventricular Rate 93 BPM    Atrial Rate 93 BPM    P-R Interval 134 ms    QRS Duration 84 ms    Q-T Interval 372 ms    QTC Calculation(Bazett) 462 ms    P Axis 30 degrees    R Axis 1 degrees    T Axis 27 degrees    Diagnosis Line Normal sinus rhythm  Normal ECG  When compared with ECG of 10-JUL-2023 01:31, (Unconfirmed)  ST no longer depressed in Anterior leads  Confirmed by CLAUDIA BALES ([id number]) on 7/10/2023 8:58:46 PM (07-10-23 @ 06:35)

## 2023-07-12 NOTE — PROGRESS NOTE ADULT - ASSESSMENT
58-year-old male with history of diabetes, hypertension, BPH, hyperlipidemia - presents with FEVER - Back Pain - Dyspnea - Weakness    suspect FERNY  Obesity  HTN  BPH  hx of DVT PE  DM  HLD  Nephrolithiasis - Bladder Stones -     ID f/u  fever noted  ct lumbar spine noted - neg  consideration for Acute Infection and emp ABX  vs noted  labs reviewed    CT renal stone hunt noted -   Lactic acid trend noted -   trops noted  ? ABX - consideration as per ID recs  ID and Cardio eval  DOAC for VTE - as per home rx regimen  FERNY eval as outpatient - weight management and sleep hygiene review - discussed  monitor VS and HD and Sat  UA - Cx - Biomarkers -   ACAP prn for Fever  DM care - serial FS -   recent visit on 7/8 - CT neg for PE, LE doppler neg for DVT, ABG noted

## 2023-07-12 NOTE — PROGRESS NOTE ADULT - ASSESSMENT
57 y/o male h/o DM type-2 HTN HLD, PE/DVT on Xarelto BIBEMS  with shortness of breath.    NSTEMI vs Demand Ischemia/DVT and PE  - CE's elevated and hsT peaked to 1100 and downtrended to 700's  - EKG without sig ischemic changes.  Would obtain EKG for CP   - Follow up TTE  - Plan for cardiac cath, TBD, pending clearance from ID.  This was discussed with patient and he is amenable  -  Start ASA 81 mg and increase statin to 40 mg    - He has a known PE/DVT and on Xarelto  - CTPA neg of PE.  SOB can be his anginal equivalent    - PT with recurrent fevers and elevated lactate on presentation likely infectious in nature.  However, infection w/u negative  - ID following; off Abx.  Lumbar imaging unremarkable.  Repeat blood C/s pending  - Compensated from HF POV.     - CARLOTTA lopez noted  - No further w/u or intervention    - D/C Xarelto (for DVT/PE) in anticipation of cardiac cath.  This was discussed with Primary, Dr. Bland  - Can start Heparin gtt tonight, 7/12, instead of Xarelto dose    - Monitor and replete lytes, keep K>4, Mg>2.  - Will continue to follow.    Laisha Gibbs DNP, NP-C, AGACNP-C  Cardiology   Call TEAMS          59 y/o male h/o DM type-2 HTN HLD, PE/DVT on Xarelto BIBEMS  with shortness of breath.    NSTEMI vs Demand Ischemia/DVT and PE  - CE's elevated and hsT peaked to 1100 and downtrended to 700's  - EKG without sig ischemic changes.  Would obtain EKG for CP   - Follow up TTE  - Plan for cardiac cath, TBD, pending clearance from ID.  This was discussed with patient and he is amenable  -  Start ASA 81 mg and increase statin to 40 mg    - He has a known PE/DVT and on Xarelto  - CTPA neg of PE.  SOB can be his anginal equivalent    - PT with recurrent fevers and elevated lactate on presentation likely infectious in nature.  However, infection w/u negative  - ID following; off Abx.  Lumbar imaging unremarkable.  Repeat blood C/s pending  - Compensated from HF POV.     - CARLOTTA lopez noted  - No further w/u or intervention    - D/C Xarelto (for DVT/PE) in anticipation of cardiac cath.  This was discussed with Primary, Dr. Chatman  - Can start Heparin gtt tonight, 7/12, instead of Xarelto dose    - Monitor and replete lytes, keep K>4, Mg>2.  - Will continue to follow.    Laisha Gibbs DNP, NP-C, AGACNP-C  Cardiology   Call TEAMS

## 2023-07-12 NOTE — CONSULT NOTE ADULT - ASSESSMENT
58 year old male with PMH DM type-2, HTN, HLD, PE/DVT on Xarelto BIBEMS presenting with rigors, high fevers, lower back pain and rising troponin. CT with large prostate and bladder stones.      ACS w/ elevated troponins (peak 1148).    plan for cardiac cath on 7/13   NPO after MN  can continue heparin  continue aspirin and statin  last xarelto 7/11 @ 5pm  ID clearance appreciated  remainder of plan per primary team/non-interventional cardiology  58 year old male with PMH DM type-2, HTN, HLD, PE/DVT on Xarelto BIBEMS presenting with rigors, high fevers, lower back pain and rising troponin. CT with large prostate and bladder stones.      ACS w/ elevated troponins (peak 1148).    plan for cardiac cath on 7/13   NPO after MN  can continue heparin  continue aspirin and statin  last xarelto 7/11 @ 5pm  f/u am labs and ecg  ID clearance appreciated  remainder of plan per primary team/non-interventional cardiology  58 year old male with PMH DM type-2, HTN, HLD, PE/DVT on Xarelto BIBEMS presenting with rigors, high fevers, lower back pain and rising troponin. CT with large prostate and bladder stones.      ACS w/ elevated troponins (peak 1148).    plan for cardiac cath on 7/13   NPO after MN  can continue heparin  continue aspirin and statin  last xarelto 7/11 @ 5pm  f/u am labs and ecg  ID clearance appreciated  remainder of plan per primary team/non-interventional cardiology     Adjusted CathPCI Bleeding Event Risk: 0.7%

## 2023-07-12 NOTE — CONSULT NOTE ADULT - SUBJECTIVE AND OBJECTIVE BOX
Department of Cardiology                                                               Division of Interventional Cardiology                                                               [facility]/[address] [address]                                                                                 [phone]                                                                                                                                          Interventional Cardiology Consult Note    HPI as noted below. Pt with elevated troponins this admission. Seen and cleared by ID for cath.      Subjective/ROS:   Denies CP, SOB, palpitations, N/V, fever/chills, abd pain, numbness/tingling/weakness, other c/o at this time.  ROS negative x 10 systems except as documented as above.    HPI:  58M with hx of PE/DVT 2022, DM, HTN, BPH, HLD presents with rigors and sob. symptoms started on friday, he was evaluated in ED and sent home. it recurred again  overnight and he returned. He complains of lower back pain in the middle just above his buttocks and is writhing in pain.   (10 Jul 2023 12:09)      PAST MEDICAL & SURGICAL HISTORY:  Type 2 diabetes mellitus  Hyperlipidemia  Hypertension  History of colonoscopy, ()    FAMILY HISTORY:  Family history of stroke (Father)  Family history of dementia (Mother)  Family history of Alzheimer's disease (Sibling)           Prior Cardiac Interventions:           MEDICATIONS  (STANDING):  amLODIPine   Tablet 5 milliGRAM(s) Oral daily  aspirin enteric coated 81 milliGRAM(s) Oral daily  atorvastatin 40 milliGRAM(s) Oral at bedtime  finasteride 5 milliGRAM(s) Oral daily  heparin  Infusion.  Unit(s)/Hr (21 mL/Hr) IV Continuous   insulin glargine Injectable (LANTUS) 8 Unit(s) SubCutaneous at bedtime  insulin lispro (ADMELOG) corrective regimen sliding scale   SubCutaneous three times a day before meals  insulin lispro Injectable (ADMELOG) 3 Unit(s) SubCutaneous three times a day before meals  tamsulosin 0.4 milliGRAM(s) Oral at bedtime    MEDICATIONS  (PRN):  acetaminophen     Tablet .. 650 milliGRAM(s) Oral every 6 hours PRN Temp greater or equal to 38C (100.4F), Mild Pain (1 - 3)  aluminum hydroxide/magnesium hydroxide/simethicone Suspension 30 milliLiter(s) Oral every 4 hours PRN Dyspepsia  bisacodyl 5 milliGRAM(s) Oral every 12 hours PRN Constipation  dextrose Oral Gel 15 Gram(s) Oral once PRN Blood Glucose LESS THAN 70 milliGRAM(s)/deciliter  heparin   Injectable 9500 Unit(s) IV Push every 6 hours PRN For aPTT less than 40  heparin   Injectable 4500 Unit(s) IV Push every 6 hours PRN For aPTT between 40 - 57  HYDROmorphone  Injectable 0.5 milliGRAM(s) IV Push every 4 hours PRN Severe Pain (7 - 10)  ketorolac   Injectable 30 milliGRAM(s) IV Push every 6 hours PRN Moderate Pain (4 - 6)  melatonin 3 milliGRAM(s) Oral at bedtime PRN Insomnia  ondansetron Injectable 4 milliGRAM(s) IV Push every 8 hours PRN Nausea and/or Vomiting    No Known Allergies      T(C): 37.1 (23 @ 12:07), Max: 37.6 (23 @ 19:24)  HR: 84 (23 @ 12:07) (73 - 95)  BP: 156/78 (23 @ 12:07) (156/78 - 157/77)  RR: 18 (23 @ 12:07) (17 - 18)  SpO2: 95% (23 @ 12:07) (91% - 96%)  Daily Weight in k.8 (2023 04:45)      TELEMETRY: 	        < from: 12 Lead ECG (07.10.23 @ 06:35) >  Ventricular Rate 93 BPM  Atrial Rate 93 BPM  P-R Interval 134 ms  QRS Duration 84 ms  Q-T Interval 372 ms  QTC Calculation(Bazett) 462 ms  P Axis 30 degrees  R Axis 1 degrees  T Axis 27 degrees  Diagnosis Line Normal sinus rhythm  Normal ECG  When compared with ECG of 10-JUL-2023 01:31, (Unconfirmed)  ST no longer depressed in Anterior leads  < end of copied text >    < from: 12 Lead ECG (07.10.23 @ 01:31) >  Ventricular Rate 138 BPM  Atrial Rate 138 BPM  P-R Interval 138 ms  QRS Duration 78 ms  Q-T Interval 274 ms  QTC Calculation(Bazett) 415 ms  P Axis 53 degrees  R Axis 17 degrees  T Axis 31 degrees  Diagnosis Line Sinus tachycardia  Otherwise normal ECG  When compared with ECG of 2023 09:28,  Vent. rate has increased BY  56 BPM  T wave inversion no longer evident in Inferior leads  < end of copied text >    < from: 12 Lead ECG (23 @ 09:28) >  Ventricular Rate 82 BPM  Atrial Rate 82 BPM  P-R Interval 154 ms  QRS Duration 80 ms  Q-T Interval 380 ms  QTC Calculation(Bazett) 443 ms  R Axis -41 degrees  T Axis -17 degrees  Diagnosis Line Normal sinus rhythm  Left axis deviation  Septal infarct , age undetermined  Abnormal ECG  When compared with ECG of 2023 03:04,  Vent. rate has decreased BY  41 BPM  QRS axis shifted left  ST no longer depressed in Lateral leads  Inverted T waves have replaced nonspecific T wave abnormality in Inferior leads  T wave inversion no longer evident in Lateral leads  < end of copied text >    < from: 12 Lead ECG (23 @ 03:04) >  Ventricular Rate 123 BPM  Atrial Rate 123 BPM  P-R Interval 150 ms  QRS Duration 80 ms  Q-T Interval 254 ms  QTC Calculation(Bazett) 363 ms  P Axis 42 degrees  R Axis 8 degrees  T Axis 85 degrees  Diagnosis Line Sinus tachycardia  Nonspecific ST and T wave abnormality  Abnormal ECG  When compared with ECG of 2016 09:10,  Vent. rate has increased BY  66 BPM  ST now depressed in Lateral leads  T wave inversion now evident in Lateral leads  < end of copied text >      < from: CT Lumbar Spine w/ IV Cont (23 @ 08:21) >  IMPRESSION:  Scoliosis with associated degenerative changes.  No gross discitis, osteomyelitis, or paraspinal abscess.  If discitis/osteomyelitis remains of clinical concern pre and postcontrast enhanced MRI can be obtained.  Please see report for additional findings.  --- End of Report ---  < end of copied text >    < from: CT Renal Stone Hunt (07.10.23 @ 04:46) >  IMPRESSION:  Multiple urinary bladder stones. No hydronephrosis or nephrolithiasis.   Enlarged prostate with mass effect on the urinary bladder.  --- End of Report ---  < end of copied text >    < from: CT Angio Chest PE Protocol w/ IV Cont (23 @ 06:08) >  FINDINGS:  LUNGS AND AIRWAYS: Patent central airways.  Lungs are clear.  PLEURA: No pleural effusion.  MEDIASTINUM AND LILLY: No lymphadenopathy.  VESSELS: Pulmonary arterial opacification is suboptimal. There is no main pulmonary embolism. Subsegmental branches cannot be assessed. There is no aortic dissection. The great vessels are not dilated. Atherosclerotic calcification of the thoracic aorta.  HEART: Heart size is normal. No pericardial effusion.  CHEST WALL AND LOWER NECK: Within normal limits.  VISUALIZED UPPER ABDOMEN: Fatty liver.  BONES: Within normal limits.  IMPRESSION:  No central pulmonary embolism. Subsegmental branches cannot be assessed.  No focal pulmonary opacity or pleural effusion.  --- End of Report ---  < end of copied text >    < from: Xray Chest 1 View- PORTABLE-Urgent (23 @ 04:00) >  IMPRESSION: Clear lungs.  < end of copied text >      LABS:	 	                        12.9   8.56  )-----------( 207      ( 2023 08:45 )             38.7     07-    137  |  102  |  16  ----------------------------<  209<H>  3.7   |  30  |  0.89    Ca    8.9      2023 08:45    TPro  6.9  /  Alb  2.7<L>  /  TBili  0.7  /  DBili  x   /  AST  35  /  ALT  30  /  AlkPhos  53  07-12    Troponin I, High Sensitivity (07.10.23 @ 13:15) 741.2 ng/L  Troponin I, High Sensitivity (07.10.23 @ 05:26) 1148.3 ng/L  Troponin I, High Sensitivity (07.10.23 @ 01:30) 364.5 ng/L  Troponin I, High Sensitivity (23 @ 12:40) 237.4 ng/L  Troponin I, High Sensitivity (23 @ 07:50) 228.1 ng/L  Troponin I, High Sensitivity (23 @ 04:10) 88.2 ng/L    A1C with Estimated Average Glucose (23 @ 06:40)    A1C with Estimated Average Glucose Result: 8.8 mg/dL      Culture - Blood (07.10.23 @ 01:40)    Specimen Source: .Blood Blood-Peripheral   Culture Results:   No growth at 48 Hours    Culture - Urine (07.10.23 @ :30)    Specimen Source: Clean Catch Clean Catch (Midstream)   Culture Results:   <10,000 CFU/mL Normal Urogenital Jessi    Culture - Blood (07.10.23 @ 01:30)    Specimen Source: .Blood Blood-Peripheral   Culture Results:   No growth at 48 Hours      Constitutional: NAD  Neuro: A+O x 3, non-focal, speech clear and intact  HEENT: NC/AT, PERRL, EOMI, anicteric sclerae, oral mucosa pink and moist  Neck: supple, no JVD  CV: regular rate, regular rhythm, +S1S2, no murmurs or rub  Pulm/chest: lung sounds CTA and equal bilaterally, no accessory muscle use noted  Abd: soft, NT, ND, +BS  Ext: ALSTON x 4, no C/C/E  Pulses: R radial 2+, R femoral 2+, bilat DP 2+  Skin: warm, well perfused  Psych: calm, appropriate affect                                                                                 Department of Cardiology                                                               Division of Interventional Cardiology                                                               [facility]/[address] [address]                                                                                 [phone]                                                                                                                                          Interventional Cardiology Consult Note    HPI as noted below. Pt with elevated troponins this admission. Seen and cleared by ID for cath.      Subjective/ROS:   Denies CP, SOB, palpitations, N/V, fever/chills, abd pain, numbness/tingling/weakness, other c/o at this time.  ROS negative x 10 systems except as documented as above.    HPI:  58M with hx of PE/DVT 2022, DM, HTN, BPH, HLD presents with rigors and sob. symptoms started on friday, he was evaluated in ED and sent home. it recurred again  overnight and he returned. He complains of lower back pain in the middle just above his buttocks and is writhing in pain.   (10 Jul 2023 12:09)      PAST MEDICAL & SURGICAL HISTORY:  Type 2 diabetes mellitus  Hyperlipidemia  Hypertension  History of colonoscopy, ()    FAMILY HISTORY:  Family history of stroke (Father)  Family history of dementia (Mother)  Family history of Alzheimer's disease (Sibling)           Prior Cardiac Interventions:           MEDICATIONS  (STANDING):  amLODIPine   Tablet 5 milliGRAM(s) Oral daily  aspirin enteric coated 81 milliGRAM(s) Oral daily  atorvastatin 40 milliGRAM(s) Oral at bedtime  finasteride 5 milliGRAM(s) Oral daily  heparin  Infusion.  Unit(s)/Hr (21 mL/Hr) IV Continuous   insulin glargine Injectable (LANTUS) 8 Unit(s) SubCutaneous at bedtime  insulin lispro (ADMELOG) corrective regimen sliding scale   SubCutaneous three times a day before meals  insulin lispro Injectable (ADMELOG) 3 Unit(s) SubCutaneous three times a day before meals  tamsulosin 0.4 milliGRAM(s) Oral at bedtime    MEDICATIONS  (PRN):  acetaminophen     Tablet .. 650 milliGRAM(s) Oral every 6 hours PRN Temp greater or equal to 38C (100.4F), Mild Pain (1 - 3)  aluminum hydroxide/magnesium hydroxide/simethicone Suspension 30 milliLiter(s) Oral every 4 hours PRN Dyspepsia  bisacodyl 5 milliGRAM(s) Oral every 12 hours PRN Constipation  dextrose Oral Gel 15 Gram(s) Oral once PRN Blood Glucose LESS THAN 70 milliGRAM(s)/deciliter  heparin   Injectable 9500 Unit(s) IV Push every 6 hours PRN For aPTT less than 40  heparin   Injectable 4500 Unit(s) IV Push every 6 hours PRN For aPTT between 40 - 57  HYDROmorphone  Injectable 0.5 milliGRAM(s) IV Push every 4 hours PRN Severe Pain (7 - 10)  ketorolac   Injectable 30 milliGRAM(s) IV Push every 6 hours PRN Moderate Pain (4 - 6)  melatonin 3 milliGRAM(s) Oral at bedtime PRN Insomnia  ondansetron Injectable 4 milliGRAM(s) IV Push every 8 hours PRN Nausea and/or Vomiting    No Known Allergies      T(C): 37.1 (23 @ 12:07), Max: 37.6 (23 @ 19:24)  HR: 84 (23 @ 12:07) (73 - 95)  BP: 156/78 (23 @ 12:07) (156/78 - 157/77)  RR: 18 (23 @ 12:07) (17 - 18)  SpO2: 95% (23 @ 12:07) (91% - 96%)  Daily Weight in k.8 (2023 04:45)      TELEMETRY: 	        < from: 12 Lead ECG (07.10.23 @ 06:35) >  Ventricular Rate 93 BPM  Atrial Rate 93 BPM  P-R Interval 134 ms  QRS Duration 84 ms  Q-T Interval 372 ms  QTC Calculation(Bazett) 462 ms  P Axis 30 degrees  R Axis 1 degrees  T Axis 27 degrees  Diagnosis Line Normal sinus rhythm  Normal ECG  When compared with ECG of 10-JUL-2023 01:31, (Unconfirmed)  ST no longer depressed in Anterior leads  < end of copied text >    < from: 12 Lead ECG (07.10.23 @ 01:31) >  Ventricular Rate 138 BPM  Atrial Rate 138 BPM  P-R Interval 138 ms  QRS Duration 78 ms  Q-T Interval 274 ms  QTC Calculation(Bazett) 415 ms  P Axis 53 degrees  R Axis 17 degrees  T Axis 31 degrees  Diagnosis Line Sinus tachycardia  Otherwise normal ECG  When compared with ECG of 2023 09:28,  Vent. rate has increased BY  56 BPM  T wave inversion no longer evident in Inferior leads  < end of copied text >    < from: 12 Lead ECG (23 @ 09:28) >  Ventricular Rate 82 BPM  Atrial Rate 82 BPM  P-R Interval 154 ms  QRS Duration 80 ms  Q-T Interval 380 ms  QTC Calculation(Bazett) 443 ms  R Axis -41 degrees  T Axis -17 degrees  Diagnosis Line Normal sinus rhythm  Left axis deviation  Septal infarct , age undetermined  Abnormal ECG  When compared with ECG of 2023 03:04,  Vent. rate has decreased BY  41 BPM  QRS axis shifted left  ST no longer depressed in Lateral leads  Inverted T waves have replaced nonspecific T wave abnormality in Inferior leads  T wave inversion no longer evident in Lateral leads  < end of copied text >    < from: 12 Lead ECG (23 @ 03:04) >  Ventricular Rate 123 BPM  Atrial Rate 123 BPM  P-R Interval 150 ms  QRS Duration 80 ms  Q-T Interval 254 ms  QTC Calculation(Bazett) 363 ms  P Axis 42 degrees  R Axis 8 degrees  T Axis 85 degrees  Diagnosis Line Sinus tachycardia  Nonspecific ST and T wave abnormality  Abnormal ECG  When compared with ECG of 2016 09:10,  Vent. rate has increased BY  66 BPM  ST now depressed in Lateral leads  T wave inversion now evident in Lateral leads  < end of copied text >      < from: CT Lumbar Spine w/ IV Cont (23 @ 08:21) >  IMPRESSION:  Scoliosis with associated degenerative changes.  No gross discitis, osteomyelitis, or paraspinal abscess.  If discitis/osteomyelitis remains of clinical concern pre and postcontrast enhanced MRI can be obtained.  Please see report for additional findings.  --- End of Report ---  < end of copied text >    < from: CT Renal Stone Hunt (07.10.23 @ 04:46) >  IMPRESSION:  Multiple urinary bladder stones. No hydronephrosis or nephrolithiasis.   Enlarged prostate with mass effect on the urinary bladder.  --- End of Report ---  < end of copied text >    < from: CT Angio Chest PE Protocol w/ IV Cont (23 @ 06:08) >  FINDINGS:  LUNGS AND AIRWAYS: Patent central airways.  Lungs are clear.  PLEURA: No pleural effusion.  MEDIASTINUM AND LILLY: No lymphadenopathy.  VESSELS: Pulmonary arterial opacification is suboptimal. There is no main pulmonary embolism. Subsegmental branches cannot be assessed. There is no aortic dissection. The great vessels are not dilated. Atherosclerotic calcification of the thoracic aorta.  HEART: Heart size is normal. No pericardial effusion.  CHEST WALL AND LOWER NECK: Within normal limits.  VISUALIZED UPPER ABDOMEN: Fatty liver.  BONES: Within normal limits.  IMPRESSION:  No central pulmonary embolism. Subsegmental branches cannot be assessed.  No focal pulmonary opacity or pleural effusion.  --- End of Report ---  < end of copied text >    < from: Xray Chest 1 View- PORTABLE-Urgent (23 @ 04:00) >  IMPRESSION: Clear lungs.  < end of copied text >      LABS:	 	                        12.9   8.56  )-----------( 207      ( 2023 08:45 )             38.7         137  |  102  |  16  ----------------------------<  209<H>  3.7   |  30  |  0.89    Ca    8.9      2023 08:45  TPro  6.9  /  Alb  2.7<L>  /  TBili  0.7  /  DBili  x   /  AST  35  /  ALT  30  /  AlkPhos  53  12    Activated Partial Thromboplastin Time (23 @ 15:41)    Activated Partial Thromboplastin Time: 29.7 sec    Troponin I, High Sensitivity (07.10.23 @ 13:15) 741.2 ng/L  Troponin I, High Sensitivity (07.10.23 @ 05:26) 1148.3 ng/L  Troponin I, High Sensitivity (07.10.23 @ 01:30) 364.5 ng/L  Troponin I, High Sensitivity (23 @ 12:40) 237.4 ng/L  Troponin I, High Sensitivity (23 @ 07:50) 228.1 ng/L  Troponin I, High Sensitivity (23 @ 04:10) 88.2 ng/L    A1C with Estimated Average Glucose (23 @ 06:40)    A1C with Estimated Average Glucose Result: 8.8 mg/dL      Culture - Blood (07.10.23 @ 01:40)    Specimen Source: .Blood Blood-Peripheral   Culture Results:   No growth at 48 Hours    Culture - Urine (07.10.23 @ 01:30)    Specimen Source: Clean Catch Clean Catch (Midstream)   Culture Results:   <10,000 CFU/mL Normal Urogenital Jessi    Culture - Blood (07.10.23 @ 01:30)    Specimen Source: .Blood Blood-Peripheral   Culture Results:   No growth at 48 Hours      Constitutional: NAD  Neuro: A+O x 3, non-focal, speech clear and intact  HEENT: NC/AT, PERRL, EOMI, anicteric sclerae, oral mucosa pink and moist  Neck: supple, no JVD  CV: regular rate, regular rhythm, +S1S2, no murmurs or rub  Pulm/chest: lung sounds CTA and equal bilaterally, no accessory muscle use noted  Abd: soft, NT, ND, +BS  Ext: ALSTON x 4, no C/C/E  Pulses: R radial 2+, R femoral 2+, bilat DP 2+  Skin: warm, well perfused  Psych: calm, appropriate affect                                                                                 Department of Cardiology                                                               Division of Interventional Cardiology                                                               [facility]/[address] [address]                                                                                 [phone]                                                                                                                                          Interventional Cardiology Consult Note    HPI as noted below. Pt with elevated troponins this admission. Seen and cleared by ID for cath.  Pt reports occasional SSCP/pressure after eating which is relieved with tums. Fatigue at times but he attributed it to his back pain.   Denies CP, SOB, palpitations, N/V, fever/chills, abd pain, numbness/tingling/weakness, other c/o at this time.  ROS negative x 10 systems except as documented as above.      HPI:  58M with hx of PE/DVT 2022, DM, HTN, BPH, HLD presents with rigors and sob. symptoms started on friday, he was evaluated in ED and sent home. it recurred again  overnight and he returned. He complains of lower back pain in the middle just above his buttocks and is writhing in pain.   (10 Jul 2023 12:09)      PAST MEDICAL & SURGICAL HISTORY:  Type 2 diabetes mellitus  Hyperlipidemia  Hypertension  History of colonoscopy, ()    FAMILY HISTORY:  Family history of stroke (Father)  Family history of dementia (Mother)  Family history of Alzheimer's disease (Sibling)    Marital status:   Lives with: wife and 2 kids (21 and 16)  Occupation: owns toy making company  ADLs: independent  Assistive Devices: none    Tobacco use: denies/never  Alcohol use: rarely  Illicit drug use: denies    Code status: full code  HCP/surrogate: wife           Prior Cardiac Interventions: none           MEDICATIONS  (STANDING):  amLODIPine   Tablet 5 milliGRAM(s) Oral daily  aspirin enteric coated 81 milliGRAM(s) Oral daily  atorvastatin 40 milliGRAM(s) Oral at bedtime  finasteride 5 milliGRAM(s) Oral daily  heparin  Infusion.  Unit(s)/Hr (21 mL/Hr) IV Continuous   insulin glargine Injectable (LANTUS) 8 Unit(s) SubCutaneous at bedtime  insulin lispro (ADMELOG) corrective regimen sliding scale   SubCutaneous three times a day before meals  insulin lispro Injectable (ADMELOG) 3 Unit(s) SubCutaneous three times a day before meals  tamsulosin 0.4 milliGRAM(s) Oral at bedtime    MEDICATIONS  (PRN):  acetaminophen     Tablet .. 650 milliGRAM(s) Oral every 6 hours PRN Temp greater or equal to 38C (100.4F), Mild Pain (1 - 3)  aluminum hydroxide/magnesium hydroxide/simethicone Suspension 30 milliLiter(s) Oral every 4 hours PRN Dyspepsia  bisacodyl 5 milliGRAM(s) Oral every 12 hours PRN Constipation  dextrose Oral Gel 15 Gram(s) Oral once PRN Blood Glucose LESS THAN 70 milliGRAM(s)/deciliter  heparin   Injectable 9500 Unit(s) IV Push every 6 hours PRN For aPTT less than 40  heparin   Injectable 4500 Unit(s) IV Push every 6 hours PRN For aPTT between 40 - 57  HYDROmorphone  Injectable 0.5 milliGRAM(s) IV Push every 4 hours PRN Severe Pain (7 - 10)  ketorolac   Injectable 30 milliGRAM(s) IV Push every 6 hours PRN Moderate Pain (4 - 6)  melatonin 3 milliGRAM(s) Oral at bedtime PRN Insomnia  ondansetron Injectable 4 milliGRAM(s) IV Push every 8 hours PRN Nausea and/or Vomiting    No Known Allergies      T(C): 37.1 (23 @ 12:07), Max: 37.6 (23 @ 19:24)  HR: 84 (23 @ 12:07) (73 - 95)  BP: 156/78 (23 @ 12:07) (156/78 - 157/77)  RR: 18 (23 @ 12:07) (17 - 18)  SpO2: 95% (23 @ 12:07) (91% - 96%)  Daily Weight in k.8 (2023 04:45)      TELEMETRY: pending placement      < from: 12 Lead ECG (07.10.23 @ 06:35) >  Ventricular Rate 93 BPM  Atrial Rate 93 BPM  P-R Interval 134 ms  QRS Duration 84 ms  Q-T Interval 372 ms  QTC Calculation(Bazett) 462 ms  P Axis 30 degrees  R Axis 1 degrees  T Axis 27 degrees  Diagnosis Line Normal sinus rhythm  Normal ECG  When compared with ECG of 10-JUL-2023 01:31, (Unconfirmed)  ST no longer depressed in Anterior leads  < end of copied text >    < from: 12 Lead ECG (07.10.23 @ 01:31) >  Ventricular Rate 138 BPM  Atrial Rate 138 BPM  P-R Interval 138 ms  QRS Duration 78 ms  Q-T Interval 274 ms  QTC Calculation(Bazett) 415 ms  P Axis 53 degrees  R Axis 17 degrees  T Axis 31 degrees  Diagnosis Line Sinus tachycardia  Otherwise normal ECG  When compared with ECG of 2023 09:28,  Vent. rate has increased BY  56 BPM  T wave inversion no longer evident in Inferior leads  < end of copied text >    < from: 12 Lead ECG (23 @ 09:28) >  Ventricular Rate 82 BPM  Atrial Rate 82 BPM  P-R Interval 154 ms  QRS Duration 80 ms  Q-T Interval 380 ms  QTC Calculation(Bazett) 443 ms  R Axis -41 degrees  T Axis -17 degrees  Diagnosis Line Normal sinus rhythm  Left axis deviation  Septal infarct , age undetermined  Abnormal ECG  When compared with ECG of 2023 03:04,  Vent. rate has decreased BY  41 BPM  QRS axis shifted left  ST no longer depressed in Lateral leads  Inverted T waves have replaced nonspecific T wave abnormality in Inferior leads  T wave inversion no longer evident in Lateral leads  < end of copied text >    < from: 12 Lead ECG (23 @ 03:04) >  Ventricular Rate 123 BPM  Atrial Rate 123 BPM  P-R Interval 150 ms  QRS Duration 80 ms  Q-T Interval 254 ms  QTC Calculation(Bazett) 363 ms  P Axis 42 degrees  R Axis 8 degrees  T Axis 85 degrees  Diagnosis Line Sinus tachycardia  Nonspecific ST and T wave abnormality  Abnormal ECG  When compared with ECG of 2016 09:10,  Vent. rate has increased BY  66 BPM  ST now depressed in Lateral leads  T wave inversion now evident in Lateral leads  < end of copied text >      < from: CT Lumbar Spine w/ IV Cont (23 @ 08:21) >  IMPRESSION:  Scoliosis with associated degenerative changes.  No gross discitis, osteomyelitis, or paraspinal abscess.  If discitis/osteomyelitis remains of clinical concern pre and postcontrast enhanced MRI can be obtained.  Please see report for additional findings.  --- End of Report ---  < end of copied text >    < from: CT Renal Stone Hunt (07.10.23 @ 04:46) >  IMPRESSION:  Multiple urinary bladder stones. No hydronephrosis or nephrolithiasis.   Enlarged prostate with mass effect on the urinary bladder.  --- End of Report ---  < end of copied text >    < from: CT Angio Chest PE Protocol w/ IV Cont (23 @ 06:08) >  FINDINGS:  LUNGS AND AIRWAYS: Patent central airways.  Lungs are clear.  PLEURA: No pleural effusion.  MEDIASTINUM AND LILLY: No lymphadenopathy.  VESSELS: Pulmonary arterial opacification is suboptimal. There is no main pulmonary embolism. Subsegmental branches cannot be assessed. There is no aortic dissection. The great vessels are not dilated. Atherosclerotic calcification of the thoracic aorta.  HEART: Heart size is normal. No pericardial effusion.  CHEST WALL AND LOWER NECK: Within normal limits.  VISUALIZED UPPER ABDOMEN: Fatty liver.  BONES: Within normal limits.  IMPRESSION:  No central pulmonary embolism. Subsegmental branches cannot be assessed.  No focal pulmonary opacity or pleural effusion.  --- End of Report ---  < end of copied text >    < from: Xray Chest 1 View- PORTABLE-Urgent (23 @ 04:00) >  IMPRESSION: Clear lungs.  < end of copied text >      LABS:	 	                        12.9   8.56  )-----------( 207      ( 2023 08:45 )             38.7         137  |  102  |  16  ----------------------------<  209<H>  3.7   |  30  |  0.89    Ca    8.9      2023 08:45  TPro  6.9  /  Alb  2.7<L>  /  TBili  0.7  /  DBili  x   /  AST  35  /  ALT  30  /  AlkPhos  53  12    Activated Partial Thromboplastin Time (23 @ 15:41)    Activated Partial Thromboplastin Time: 29.7 sec    Troponin I, High Sensitivity (07.10.23 @ 13:15) 741.2 ng/L  Troponin I, High Sensitivity (07.10.23 @ 05:26) 1148.3 ng/L  Troponin I, High Sensitivity (07.10.23 @ 01:30) 364.5 ng/L  Troponin I, High Sensitivity (23 @ 12:40) 237.4 ng/L  Troponin I, High Sensitivity (23 @ 07:50) 228.1 ng/L  Troponin I, High Sensitivity (23 @ 04:10) 88.2 ng/L    A1C with Estimated Average Glucose (23 @ 06:40)    A1C with Estimated Average Glucose Result: 8.8 mg/dL      Culture - Blood (07.10.23 @ 01:40)    Specimen Source: .Blood Blood-Peripheral   Culture Results:   No growth at 48 Hours    Culture - Urine (07.10.23 @ 01:30)    Specimen Source: Clean Catch Clean Catch (Midstream)   Culture Results:   <10,000 CFU/mL Normal Urogenital Jessi    Culture - Blood (07.10.23 @ 01:30)    Specimen Source: .Blood Blood-Peripheral   Culture Results:   No growth at 48 Hours      Constitutional: NAD  Neuro: A+O x 3, non-focal, speech clear and intact  HEENT: NC/AT, PERRL, EOMI, anicteric sclerae, oral mucosa pink and moist  Neck: supple, no JVD  CV: regular rate, regular rhythm, +S1S2, no murmurs or rub  Pulm/chest: lung sounds CTA and equal bilaterally, no accessory muscle use noted  Abd: soft, NT, ND, +BS  Ext: ALSTON x 4, no C/C/E  Pulses: R radial 2+, bilat DP 2+  Skin: warm, well perfused  Psych: calm, appropriate affect

## 2023-07-12 NOTE — PROGRESS NOTE ADULT - ASSESSMENT
58m presenting with rigors, high fevers, lower back pain and rising troponin  CT with large prostate and bladder stones  mild pyuria on UA  no leukocytosis  radiculopathy?  PSA, crp, and procal markedly elevated unclear significance  ID, pulm and urology following  no clear source of infection  cultures NGTD    rising troponin  cardio following  continue telemetry  asa   heparin gtt- switched to xarelto in  anticipation of cardiac cath in AM    dm  diabetic diet  fingersticks  sliding scale  basal bolus as needed   on weight loss  hold oral meds    BPH  continue flomax and proscar    HTN/HLD  continue norvasc and lipitor    [facility]   [phone] 58m presenting with rigors, high fevers, lower back pain and rising troponin  CT with large prostate and bladder stones  mild pyuria on UA  no leukocytosis  radiculopathy?  PSA, crp, and procal markedly elevated unclear significance  ID, pulm and urology following  no clear source of infection  cultures NGTD    rising troponin  cardio following  continue telemetry  asa  xarelto- switched to hep gtt   anticipation of cardiac cath in AM    dm  diabetic diet  fingersticks  sliding scale  basal bolus as needed   on weight loss  hold oral meds    BPH  continue flomax and proscar    HTN/HLD  continue norvasc and lipitor    [facility]   [phone]

## 2023-07-12 NOTE — PROGRESS NOTE ADULT - ASSESSMENT
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 Symptoms started on friday, he was evaluated in ED and sent home. it recurred again 7/9 overnight and he returned. He complains of lower back pain in the middle just above his buttocks and several transient episodes of dyspnea    RECOMMENDATIONS  Presentation suggesting an acute infectious process and concerning with point tenderness over spine. Pt is clinically stable and dose of ceftriaxone given in ER 7/10.   Recommend continued evaluation to clarify etiology  -imaging of lumbar sacral spine noted  -blood and urine cultures - NGTD  -TTE planned  -will send additional testing  -clinically stable to obs off abx  -as discussed with PT no issues from ID with proceeding with cardiac cath or other investigations  -as we are observing pt off abx fine with dc as along as we follow up next week.    Thank you for consulting us and involving us in the management of this most interesting and challenging case.  We will follow along in the care of this patient. Please call us at [phone] or text me directly on my cell# at [phone] with any concerns.

## 2023-07-12 NOTE — PROGRESS NOTE ADULT - SUBJECTIVE AND OBJECTIVE BOX
Patient is a 58y old  Male who presents with a chief complaint of rigors, back pain (12 Jul 2023 15:28)      INTERVAL HPI/OVERNIGHT EVENTS: noted  pt seen and examined this am   events noted  low back pain      Vital Signs Last 24 Hrs  T(C): 37.1 (12 Jul 2023 12:07), Max: 37.6 (11 Jul 2023 19:24)  T(F): 98.7 (12 Jul 2023 12:07), Max: 99.7 (11 Jul 2023 19:24)  HR: 84 (12 Jul 2023 12:07) (73 - 95)  BP: 156/78 (12 Jul 2023 12:07) (156/78 - 157/77)  BP(mean): --  RR: 18 (12 Jul 2023 12:07) (17 - 18)  SpO2: 95% (12 Jul 2023 12:07) (91% - 96%)    Parameters below as of 12 Jul 2023 12:07  Patient On (Oxygen Delivery Method): room air        acetaminophen     Tablet .. 650 milliGRAM(s) Oral every 6 hours PRN  aluminum hydroxide/magnesium hydroxide/simethicone Suspension 30 milliLiter(s) Oral every 4 hours PRN  amLODIPine   Tablet 5 milliGRAM(s) Oral daily  aspirin enteric coated 81 milliGRAM(s) Oral daily  atorvastatin 40 milliGRAM(s) Oral at bedtime  bisacodyl 5 milliGRAM(s) Oral every 12 hours PRN  dextrose 5%. 1000 milliLiter(s) IV Continuous <Continuous>  dextrose 5%. 1000 milliLiter(s) IV Continuous <Continuous>  dextrose 50% Injectable 12.5 Gram(s) IV Push once  dextrose 50% Injectable 25 Gram(s) IV Push once  dextrose 50% Injectable 25 Gram(s) IV Push once  dextrose Oral Gel 15 Gram(s) Oral once PRN  finasteride 5 milliGRAM(s) Oral daily  glucagon  Injectable 1 milliGRAM(s) IntraMuscular once  heparin   Injectable 9500 Unit(s) IV Push every 6 hours PRN  heparin   Injectable 4500 Unit(s) IV Push every 6 hours PRN  heparin  Infusion.  Unit(s)/Hr IV Continuous <Continuous>  HYDROmorphone  Injectable 0.5 milliGRAM(s) IV Push every 4 hours PRN  insulin glargine Injectable (LANTUS) 8 Unit(s) SubCutaneous at bedtime  insulin lispro (ADMELOG) corrective regimen sliding scale   SubCutaneous three times a day before meals  insulin lispro Injectable (ADMELOG) 3 Unit(s) SubCutaneous three times a day before meals  ketorolac   Injectable 30 milliGRAM(s) IV Push every 6 hours PRN  melatonin 3 milliGRAM(s) Oral at bedtime PRN  ondansetron Injectable 4 milliGRAM(s) IV Push every 8 hours PRN  tamsulosin 0.4 milliGRAM(s) Oral at bedtime      PHYSICAL EXAM:  GENERAL: NAD,   EYES: conjunctiva and sclera clear  ENMT: Moist mucous membranes  NECK: Supple, No JVD, Normal thyroid  CHEST/LUNG: non labored, cta b/l  HEART: Regular rate and rhythm; No murmurs, rubs, or gallops  ABDOMEN: Soft, Nontender, Nondistended; Bowel sounds present  EXTREMITIES:  2+ Peripheral Pulses, No clubbing, cyanosis, or edema  LYMPH: No lymphadenopathy noted  SKIN: No rashes or lesions    Consultant(s) Notes Reviewed:  [x ] YES  [ ] NO  Care Discussed with Consultants/Other Providers [ x] YES  [ ] NO    LABS:                        12.9   8.56  )-----------( 207      ( 12 Jul 2023 08:45 )             38.7     07-12    137  |  102  |  16  ----------------------------<  209<H>  3.7   |  30  |  0.89    Ca    8.9      12 Jul 2023 08:45    TPro  6.9  /  Alb  2.7<L>  /  TBili  0.7  /  DBili  x   /  AST  35  /  ALT  30  /  AlkPhos  53  07-12    PTT - ( 12 Jul 2023 15:41 )  PTT:29.7 sec  Urinalysis Basic - ( 12 Jul 2023 08:45 )    Color: x / Appearance: x / SG: x / pH: x  Gluc: 209 mg/dL / Ketone: x  / Bili: x / Urobili: x   Blood: x / Protein: x / Nitrite: x   Leuk Esterase: x / RBC: x / WBC x   Sq Epi: x / Non Sq Epi: x / Bacteria: x      CAPILLARY BLOOD GLUCOSE      POCT Blood Glucose.: 150 mg/dL (12 Jul 2023 16:41)  POCT Blood Glucose.: 220 mg/dL (12 Jul 2023 13:23)  POCT Blood Glucose.: 182 mg/dL (12 Jul 2023 12:11)  POCT Blood Glucose.: 167 mg/dL (12 Jul 2023 08:15)  POCT Blood Glucose.: 220 mg/dL (11 Jul 2023 21:09)        Urinalysis Basic - ( 12 Jul 2023 08:45 )    Color: x / Appearance: x / SG: x / pH: x  Gluc: 209 mg/dL / Ketone: x  / Bili: x / Urobili: x   Blood: x / Protein: x / Nitrite: x   Leuk Esterase: x / RBC: x / WBC x   Sq Epi: x / Non Sq Epi: x / Bacteria: x        Babesia microti PCR, Bld (collected 11 Jul 2023 06:40)    Culture - Blood (collected 10 Jul 2023 01:40)  Source: .Blood Blood-Peripheral  Preliminary Report (12 Jul 2023 07:01):    No growth at 48 Hours    Culture - Blood (collected 10 Jul 2023 01:30)  Source: .Blood Blood-Peripheral  Preliminary Report (12 Jul 2023 07:01):    No growth at 48 Hours    Culture - Urine (collected 10 Jul 2023 01:30)  Source: Clean Catch Clean Catch (Midstream)  Final Report (11 Jul 2023 07:13):    <10,000 CFU/mL Normal Urogenital Jessi        RADIOLOGY & ADDITIONAL TESTS:    Imaging Personally Reviewed:  [x ] YES  [ ] NO

## 2023-07-12 NOTE — CHART NOTE - NSCHARTNOTEFT_GEN_A_CORE
Patient is cleared fro cath by ID.  Will make NPO p MN and repeat EKG in am.  Will obtain labs including coags in am.  Will monitor on tele again. Patient is cleared fro cath by ID.  Will make NPO p MN and repeat EKG in am.  Will obtain labs including coags in am.  Will monitor on tele again.  Will start Heparin gtt for Hx DVT/PE at 6pm tonight as discussed with Primary, Dr. Chatman.

## 2023-07-12 NOTE — PROGRESS NOTE ADULT - SUBJECTIVE AND OBJECTIVE BOX
[facility] DIV[facility] of INFECTIOUS DISEASE  Alfred Canela MD PhD, Leila Gamez MD, Mei Adams MD, Garth Mensah MD, Sg Payne MD  and providing coverage with Lizzeth Toledo MD  Providing Infectious Disease Consultations at [facility], [facility], [place], [place], [facility], [facility]'s    Office# [phone] to schedule follow up appointments  Answering Service for urgent calls or New Consults [phone]  Cell# to text for urgent issues Alfred Canela [phone]     infectious diseases progress note:    GEORGIE CANELA is a 58y y. o. Male patient    Overnight and events of the last 24hrs reviewed, pt report back pain is improving and no issues overnight    Allergies    No Known Allergies    Intolerances        ANTIBIOTICS/RELEVANT:  antimicrobials    immunologic:    OTHER:  acetaminophen     Tablet .. 650 milliGRAM(s) Oral every 6 hours PRN  aluminum hydroxide/magnesium hydroxide/simethicone Suspension 30 milliLiter(s) Oral every 4 hours PRN  amLODIPine   Tablet 5 milliGRAM(s) Oral daily  aspirin enteric coated 81 milliGRAM(s) Oral daily  atorvastatin 40 milliGRAM(s) Oral at bedtime  bisacodyl 5 milliGRAM(s) Oral every 12 hours PRN  dextrose 5%. 1000 milliLiter(s) IV Continuous <Continuous>  dextrose 5%. 1000 milliLiter(s) IV Continuous <Continuous>  dextrose 50% Injectable 12.5 Gram(s) IV Push once  dextrose 50% Injectable 25 Gram(s) IV Push once  dextrose 50% Injectable 25 Gram(s) IV Push once  dextrose Oral Gel 15 Gram(s) Oral once PRN  finasteride 5 milliGRAM(s) Oral daily  glucagon  Injectable 1 milliGRAM(s) IntraMuscular once  HYDROmorphone  Injectable 0.5 milliGRAM(s) IV Push every 4 hours PRN  insulin glargine Injectable (LANTUS) 8 Unit(s) SubCutaneous at bedtime  insulin lispro (ADMELOG) corrective regimen sliding scale   SubCutaneous three times a day before meals  insulin lispro Injectable (ADMELOG) 3 Unit(s) SubCutaneous three times a day before meals  ketorolac   Injectable 30 milliGRAM(s) IV Push every 6 hours PRN  melatonin 3 milliGRAM(s) Oral at bedtime PRN  ondansetron Injectable 4 milliGRAM(s) IV Push every 8 hours PRN  tamsulosin 0.4 milliGRAM(s) Oral at bedtime      Objective:  Vital Signs Last 24 Hrs  T(C): 37.1 (12 Jul 2023 12:07), Max: 37.6 (11 Jul 2023 19:24)  T(F): 98.7 (12 Jul 2023 12:07), Max: 99.7 (11 Jul 2023 19:24)  HR: 84 (12 Jul 2023 12:07) (73 - 95)  BP: 156/78 (12 Jul 2023 12:07) (156/78 - 157/77)  BP(mean): --  RR: 18 (12 Jul 2023 12:07) (17 - 18)  SpO2: 95% (12 Jul 2023 12:07) (91% - 96%)    Parameters below as of 12 Jul 2023 12:07  Patient On (Oxygen Delivery Method): room air        T(C): 37.1 (07-12-23 @ 12:07), Max: 38.7 (07-11-23 @ 11:46)  T(C): 37.1 (07-12-23 @ 12:07), Max: 39.4 (07-10-23 @ 01:16)  T(C): 37.1 (07-12-23 @ 12:07), Max: 39.4 (07-10-23 @ 01:16)    PHYSICAL EXAM:  HEENT: NC atraumatic  Neck: supple  Respiratory: no accessory muscle use, breathing comfortably  Cardiovascular: distant  Gastrointestinal: normal appearing, nondistended  Extremities: no clubbing, no cyanosis,        LABS:                          12.9   8.56  )-----------( 207      ( 12 Jul 2023 08:45 )             38.7       WBC  8.56 07-12 @ 08:45  9.06 07-11 @ 06:40  4.06 07-10 @ 01:30  4.79 07-08 @ 04:10      07-12    137  |  102  |  16  ----------------------------<  209<H>  3.7   |  30  |  0.89    Ca    8.9      12 Jul 2023 08:45    TPro  6.9  /  Alb  2.7<L>  /  TBili  0.7  /  DBili  x   /  AST  35  /  ALT  30  /  AlkPhos  53  07-12      Creatinine: 0.89 mg/dL (07-12-23 @ 08:45)  Creatinine: 1.00 mg/dL (07-11-23 @ 06:40)  Creatinine: 1.10 mg/dL (07-10-23 @ 01:30)  Creatinine: 1.10 mg/dL (07-08-23 @ 04:10)        Urinalysis Basic - ( 12 Jul 2023 08:45 )    Color: x / Appearance: x / SG: x / pH: x  Gluc: 209 mg/dL / Ketone: x  / Bili: x / Urobili: x   Blood: x / Protein: x / Nitrite: x   Leuk Esterase: x / RBC: x / WBC x   Sq Epi: x / Non Sq Epi: x / Bacteria: x            INFLAMMATORY MARKERS      MICROBIOLOGY:              RADIOLOGY & ADDITIONAL STUDIES:

## 2023-07-12 NOTE — CONSULT NOTE ADULT - CONSULT REASON
hx of VTE  obesity  FERNY suspected  fever  nephrolithiasis -   elev trops
r/o prostatitis, bladder stones on CT
evaluation of patient for cardiac cath
rigors, back pain
SOB

## 2023-07-13 LAB
ANION GAP SERPL CALC-SCNC: 6 MMOL/L — SIGNIFICANT CHANGE UP (ref 5–17)
APTT BLD: 135.4 SEC — CRITICAL HIGH (ref 27.5–35.5)
APTT BLD: 38.8 SEC — HIGH (ref 27.5–35.5)
B HENSELAE IGG SER-ACNC: NEGATIVE TITER — SIGNIFICANT CHANGE UP
B HENSELAE IGM SER-ACNC: NEGATIVE TITER — SIGNIFICANT CHANGE UP
B QUINTANA IGG SERPL-ACNC: NEGATIVE TITER — SIGNIFICANT CHANGE UP
B QUINTANA IGM SER-ACNC: NEGATIVE TITER — SIGNIFICANT CHANGE UP
BUN SERPL-MCNC: 14 MG/DL — SIGNIFICANT CHANGE UP (ref 7–23)
C BURNET PH1 + PH2 IGG+IGM SER-IMP: SIGNIFICANT CHANGE UP
C BURNET PH1 IGG TITR FLD: SIGNIFICANT CHANGE UP
C BURNET PH1 IGG TITR FLD: SIGNIFICANT CHANGE UP
C BURNET PH1 IGM TITR FLD: SIGNIFICANT CHANGE UP
C BURNET PH1 IGM TITR FLD: SIGNIFICANT CHANGE UP
CALCIUM SERPL-MCNC: 8.5 MG/DL — SIGNIFICANT CHANGE UP (ref 8.5–10.1)
CHLORIDE SERPL-SCNC: 106 MMOL/L — SIGNIFICANT CHANGE UP (ref 96–108)
CO2 SERPL-SCNC: 28 MMOL/L — SIGNIFICANT CHANGE UP (ref 22–31)
CREAT SERPL-MCNC: 0.76 MG/DL — SIGNIFICANT CHANGE UP (ref 0.5–1.3)
CULTURE RESULTS: SIGNIFICANT CHANGE UP
CULTURE RESULTS: SIGNIFICANT CHANGE UP
EGFR: 104 ML/MIN/1.73M2 — SIGNIFICANT CHANGE UP
GLUCOSE SERPL-MCNC: 173 MG/DL — HIGH (ref 70–99)
HCT VFR BLD CALC: 36.7 % — LOW (ref 39–50)
HCT VFR BLD CALC: 38.6 % — LOW (ref 39–50)
HGB BLD-MCNC: 11.9 G/DL — LOW (ref 13–17)
HGB BLD-MCNC: 12.9 G/DL — LOW (ref 13–17)
INR BLD: 1.41 RATIO — HIGH (ref 0.88–1.16)
MAGNESIUM SERPL-MCNC: 1.8 MG/DL — SIGNIFICANT CHANGE UP (ref 1.6–2.6)
MCHC RBC-ENTMCNC: 28.4 PG — SIGNIFICANT CHANGE UP (ref 27–34)
MCHC RBC-ENTMCNC: 29.2 PG — SIGNIFICANT CHANGE UP (ref 27–34)
MCHC RBC-ENTMCNC: 32.4 GM/DL — SIGNIFICANT CHANGE UP (ref 32–36)
MCHC RBC-ENTMCNC: 33.4 GM/DL — SIGNIFICANT CHANGE UP (ref 32–36)
MCV RBC AUTO: 87.3 FL — SIGNIFICANT CHANGE UP (ref 80–100)
MCV RBC AUTO: 87.6 FL — SIGNIFICANT CHANGE UP (ref 80–100)
NRBC # BLD: 0 /100 WBCS — SIGNIFICANT CHANGE UP (ref 0–0)
NRBC # BLD: 0 /100 WBCS — SIGNIFICANT CHANGE UP (ref 0–0)
PHOSPHATE SERPL-MCNC: 3.4 MG/DL — SIGNIFICANT CHANGE UP (ref 2.5–4.5)
PLATELET # BLD AUTO: 232 K/UL — SIGNIFICANT CHANGE UP (ref 150–400)
PLATELET # BLD AUTO: 239 K/UL — SIGNIFICANT CHANGE UP (ref 150–400)
POTASSIUM SERPL-MCNC: 3.7 MMOL/L — SIGNIFICANT CHANGE UP (ref 3.5–5.3)
POTASSIUM SERPL-SCNC: 3.7 MMOL/L — SIGNIFICANT CHANGE UP (ref 3.5–5.3)
PROTHROM AB SERPL-ACNC: 16.5 SEC — HIGH (ref 10.5–13.4)
RBC # BLD: 4.19 M/UL — LOW (ref 4.2–5.8)
RBC # BLD: 4.42 M/UL — SIGNIFICANT CHANGE UP (ref 4.2–5.8)
RBC # FLD: 12.9 % — SIGNIFICANT CHANGE UP (ref 10.3–14.5)
RBC # FLD: 12.9 % — SIGNIFICANT CHANGE UP (ref 10.3–14.5)
SODIUM SERPL-SCNC: 140 MMOL/L — SIGNIFICANT CHANGE UP (ref 135–145)
SPECIMEN SOURCE: SIGNIFICANT CHANGE UP
SPECIMEN SOURCE: SIGNIFICANT CHANGE UP
WBC # BLD: 8.2 K/UL — SIGNIFICANT CHANGE UP (ref 3.8–10.5)
WBC # BLD: 9.5 K/UL — SIGNIFICANT CHANGE UP (ref 3.8–10.5)
WBC # FLD AUTO: 8.2 K/UL — SIGNIFICANT CHANGE UP (ref 3.8–10.5)
WBC # FLD AUTO: 9.5 K/UL — SIGNIFICANT CHANGE UP (ref 3.8–10.5)

## 2023-07-13 PROCEDURE — 99152 MOD SED SAME PHYS/QHP 5/>YRS: CPT

## 2023-07-13 PROCEDURE — 93306 TTE W/DOPPLER COMPLETE: CPT | Mod: 26

## 2023-07-13 PROCEDURE — 93010 ELECTROCARDIOGRAM REPORT: CPT

## 2023-07-13 PROCEDURE — 93458 L HRT ARTERY/VENTRICLE ANGIO: CPT | Mod: 26

## 2023-07-13 PROCEDURE — 99232 SBSQ HOSP IP/OBS MODERATE 35: CPT

## 2023-07-13 RX ADMIN — Medication 30 MILLIGRAM(S): at 12:14

## 2023-07-13 RX ADMIN — Medication 81 MILLIGRAM(S): at 09:53

## 2023-07-13 RX ADMIN — Medication 1: at 08:40

## 2023-07-13 RX ADMIN — Medication 30 MILLIGRAM(S): at 02:48

## 2023-07-13 RX ADMIN — Medication 30 MILLIGRAM(S): at 19:50

## 2023-07-13 RX ADMIN — Medication 3 UNIT(S): at 17:27

## 2023-07-13 RX ADMIN — Medication 1: at 17:29

## 2023-07-13 RX ADMIN — Medication 3 UNIT(S): at 17:30

## 2023-07-13 RX ADMIN — Medication 3 MILLIGRAM(S): at 21:27

## 2023-07-13 RX ADMIN — ATORVASTATIN CALCIUM 40 MILLIGRAM(S): 80 TABLET, FILM COATED ORAL at 21:26

## 2023-07-13 RX ADMIN — HEPARIN SODIUM 2600 UNIT(S)/HR: 5000 INJECTION INTRAVENOUS; SUBCUTANEOUS at 02:40

## 2023-07-13 RX ADMIN — TAMSULOSIN HYDROCHLORIDE 0.4 MILLIGRAM(S): 0.4 CAPSULE ORAL at 21:27

## 2023-07-13 RX ADMIN — HEPARIN SODIUM 2600 UNIT(S)/HR: 5000 INJECTION INTRAVENOUS; SUBCUTANEOUS at 07:30

## 2023-07-13 RX ADMIN — Medication 30 MILLIGRAM(S): at 12:30

## 2023-07-13 RX ADMIN — Medication 30 MILLIGRAM(S): at 19:33

## 2023-07-13 RX ADMIN — FINASTERIDE 5 MILLIGRAM(S): 5 TABLET, FILM COATED ORAL at 17:26

## 2023-07-13 RX ADMIN — AMLODIPINE BESYLATE 5 MILLIGRAM(S): 2.5 TABLET ORAL at 05:03

## 2023-07-13 RX ADMIN — INSULIN GLARGINE 8 UNIT(S): 100 INJECTION, SOLUTION SUBCUTANEOUS at 21:45

## 2023-07-13 RX ADMIN — HEPARIN SODIUM 9500 UNIT(S): 5000 INJECTION INTRAVENOUS; SUBCUTANEOUS at 02:49

## 2023-07-13 RX ADMIN — HEPARIN SODIUM 2600 UNIT(S)/HR: 5000 INJECTION INTRAVENOUS; SUBCUTANEOUS at 04:30

## 2023-07-13 RX ADMIN — Medication 30 MILLIGRAM(S): at 03:18

## 2023-07-13 RX ADMIN — Medication 1: at 12:23
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
## 2023-07-13 NOTE — ASU PREOP CHECKLIST - ALLERGY BAND ON
Spine appears normal, range of motion is not limited, no muscle or joint tenderness
no known allergies

## 2023-07-13 NOTE — PROGRESS NOTE ADULT - SUBJECTIVE AND OBJECTIVE BOX
[facility] -- Pedro Saeed Pannella, Patel, Savella, Goodger  Office # [phone]    Follow Up: Elevated Troponins    Subjective/Observations: Seen and evaluated this morning.  Denies any form of respiratory or cardiac discomfort.  No tele events.  c/o lower back pain    REVIEW OF SYSTEMS: All other review of systems is negative unless indicated above  PAST MEDICAL & SURGICAL HISTORY:  Type 2 diabetes mellitus  Hyperlipidemia  Hypertension  History of colonoscopy  (2014)  MEDICATIONS  (STANDING):  amLODIPine   Tablet 5 milliGRAM(s) Oral daily  aspirin enteric coated 81 milliGRAM(s) Oral daily  atorvastatin 40 milliGRAM(s) Oral at bedtime  dextrose 5%. 1000 milliLiter(s) (100 mL/Hr) IV Continuous <Continuous>  dextrose 5%. 1000 milliLiter(s) (50 mL/Hr) IV Continuous <Continuous>  dextrose 50% Injectable 12.5 Gram(s) IV Push once  dextrose 50% Injectable 25 Gram(s) IV Push once  dextrose 50% Injectable 25 Gram(s) IV Push once  finasteride 5 milliGRAM(s) Oral daily  glucagon  Injectable 1 milliGRAM(s) IntraMuscular once  heparin  Infusion.  Unit(s)/Hr (21 mL/Hr) IV Continuous <Continuous>  insulin glargine Injectable (LANTUS) 8 Unit(s) SubCutaneous at bedtime  insulin lispro (ADMELOG) corrective regimen sliding scale   SubCutaneous three times a day before meals  insulin lispro Injectable (ADMELOG) 3 Unit(s) SubCutaneous three times a day before meals  tamsulosin 0.4 milliGRAM(s) Oral at bedtime    MEDICATIONS  (PRN):  acetaminophen     Tablet .. 650 milliGRAM(s) Oral every 6 hours PRN Temp greater or equal to 38C (100.4F), Mild Pain (1 - 3)  aluminum hydroxide/magnesium hydroxide/simethicone Suspension 30 milliLiter(s) Oral every 4 hours PRN Dyspepsia  bisacodyl 5 milliGRAM(s) Oral every 12 hours PRN Constipation  dextrose Oral Gel 15 Gram(s) Oral once PRN Blood Glucose LESS THAN 70 milliGRAM(s)/deciliter  heparin   Injectable 4500 Unit(s) IV Push every 6 hours PRN For aPTT between 40 - 57  heparin   Injectable 9500 Unit(s) IV Push every 6 hours PRN For aPTT less than 40  HYDROmorphone  Injectable 0.5 milliGRAM(s) IV Push every 4 hours PRN Severe Pain (7 - 10)  ketorolac   Injectable 30 milliGRAM(s) IV Push every 6 hours PRN Moderate Pain (4 - 6)  melatonin 3 milliGRAM(s) Oral at bedtime PRN Insomnia  ondansetron Injectable 4 milliGRAM(s) IV Push every 8 hours PRN Nausea and/or Vomiting    Allergies    No Known Allergies    Intolerances    Vital Signs Last 24 Hrs  T(C): 37.5 (13 Jul 2023 09:32), Max: 37.5 (13 Jul 2023 09:32)  T(F): 99.5 (13 Jul 2023 09:32), Max: 99.5 (13 Jul 2023 09:32)  HR: 86 (13 Jul 2023 09:32) (78 - 89)  BP: 163/85 (13 Jul 2023 09:32) (156/78 - 178/91)  BP(mean): --  RR: 20 (13 Jul 2023 09:32) (18 - 20)  SpO2: 93% (13 Jul 2023 09:29) (93% - 96%)    Parameters below as of 13 Jul 2023 09:29  Patient On (Oxygen Delivery Method): room air    I&O's Summary    12 Jul 2023 07:01  -  13 Jul 2023 07:00  --------------------------------------------------------  IN: 241 mL / OUT: 0 mL / NET: 241 mL      Weight (kg): 119.975 (07-13 @ 09:32)  PHYSICAL EXAM:  TELE: NSR  Constitutional: NAD, awake and alert, well-developed  HEENT: Moist Mucous Membranes, Anicteric  Pulmonary: Non-labored, breath sounds are clear bilaterally, No wheezing, rales or rhonchi  Cardiovascular: Regular, S1 and S2, No murmurs, rubs, gallops or clicks  Gastrointestinal: Bowel Sounds present, softly distended, nontender.   Lymph: Trace peripheral edema. No lymphadenopathy.  Skin: No visible rashes or ulcers.  Psych:  Mood & affect appropriate  LABS: All Labs Reviewed:                        11.9   8.20  )-----------( 232      ( 13 Jul 2023 08:25 )             36.7                         12.9   9.50  )-----------( 239      ( 13 Jul 2023 01:16 )             38.6                         12.9   8.56  )-----------( 207      ( 12 Jul 2023 08:45 )             38.7     13 Jul 2023 08:25    140    |  106    |  14     ----------------------------<  173    3.7     |  28     |  0.76   12 Jul 2023 08:45    137    |  102    |  16     ----------------------------<  209    3.7     |  30     |  0.89   11 Jul 2023 06:40    140    |  106    |  14     ----------------------------<  193    4.2     |  28     |  1.00     Ca    8.5        13 Jul 2023 08:25  Ca    8.9        12 Jul 2023 08:45  Ca    8.8        11 Jul 2023 06:40  Phos  3.4       13 Jul 2023 08:25  Mg     1.8       13 Jul 2023 08:25    TPro  6.9    /  Alb  2.7    /  TBili  0.7    /  DBili  x      /  AST  35     /  ALT  30     /  AlkPhos  53     12 Jul 2023 08:45  TPro  7.2    /  Alb  2.9    /  TBili  0.7    /  DBili  x      /  AST  45     /  ALT  33     /  AlkPhos  53     11 Jul 2023 06:40    PTT - ( 13 Jul 2023 01:16 )  PTT:38.8 sec    12 Lead ECG:   Ventricular Rate 93 BPM    Atrial Rate 93 BPM    P-R Interval 134 ms    QRS Duration 84 ms    Q-T Interval 372 ms    QTC Calculation(Bazett) 462 ms    P Axis 30 degrees    R Axis 1 degrees    T Axis 27 degrees    Diagnosis Line Normal sinus rhythm  Normal ECG  When compared with ECG of 10-JUL-2023 01:31, (Unconfirmed)  ST no longer depressed in Anterior leads  Confirmed by CLAUDIA BALES ([id number]) on 7/10/2023 8:58:46 PM (07-10-23 @ 06:35)

## 2023-07-13 NOTE — PROGRESS NOTE ADULT - SUBJECTIVE AND OBJECTIVE BOX
Date/Time Patient Seen:  		  Referring MD:   Data Reviewed	       Patient is a 58y old  Male who presents with a chief complaint of rigors, back pain (12 Jul 2023 17:53)      Subjective/HPI     PAST MEDICAL & SURGICAL HISTORY:  Type 2 diabetes mellitus    Hyperlipidemia    Hypertension    History of colonoscopy  (2014)          Medication list         MEDICATIONS  (STANDING):  amLODIPine   Tablet 5 milliGRAM(s) Oral daily  aspirin enteric coated 81 milliGRAM(s) Oral daily  atorvastatin 40 milliGRAM(s) Oral at bedtime  dextrose 5%. 1000 milliLiter(s) (50 mL/Hr) IV Continuous <Continuous>  dextrose 5%. 1000 milliLiter(s) (100 mL/Hr) IV Continuous <Continuous>  dextrose 50% Injectable 12.5 Gram(s) IV Push once  dextrose 50% Injectable 25 Gram(s) IV Push once  dextrose 50% Injectable 25 Gram(s) IV Push once  finasteride 5 milliGRAM(s) Oral daily  glucagon  Injectable 1 milliGRAM(s) IntraMuscular once  heparin  Infusion.  Unit(s)/Hr (21 mL/Hr) IV Continuous <Continuous>  insulin glargine Injectable (LANTUS) 8 Unit(s) SubCutaneous at bedtime  insulin lispro (ADMELOG) corrective regimen sliding scale   SubCutaneous three times a day before meals  insulin lispro Injectable (ADMELOG) 3 Unit(s) SubCutaneous three times a day before meals  tamsulosin 0.4 milliGRAM(s) Oral at bedtime    MEDICATIONS  (PRN):  acetaminophen     Tablet .. 650 milliGRAM(s) Oral every 6 hours PRN Temp greater or equal to 38C (100.4F), Mild Pain (1 - 3)  aluminum hydroxide/magnesium hydroxide/simethicone Suspension 30 milliLiter(s) Oral every 4 hours PRN Dyspepsia  bisacodyl 5 milliGRAM(s) Oral every 12 hours PRN Constipation  dextrose Oral Gel 15 Gram(s) Oral once PRN Blood Glucose LESS THAN 70 milliGRAM(s)/deciliter  heparin   Injectable 9500 Unit(s) IV Push every 6 hours PRN For aPTT less than 40  heparin   Injectable 4500 Unit(s) IV Push every 6 hours PRN For aPTT between 40 - 57  HYDROmorphone  Injectable 0.5 milliGRAM(s) IV Push every 4 hours PRN Severe Pain (7 - 10)  ketorolac   Injectable 30 milliGRAM(s) IV Push every 6 hours PRN Moderate Pain (4 - 6)  melatonin 3 milliGRAM(s) Oral at bedtime PRN Insomnia  ondansetron Injectable 4 milliGRAM(s) IV Push every 8 hours PRN Nausea and/or Vomiting         Vitals log        ICU Vital Signs Last 24 Hrs  T(C): 36.8 (13 Jul 2023 04:47), Max: 37.1 (12 Jul 2023 12:07)  T(F): 98.2 (13 Jul 2023 04:47), Max: 98.7 (12 Jul 2023 12:07)  HR: 78 (13 Jul 2023 04:47) (78 - 84)  BP: 163/68 (13 Jul 2023 04:47) (156/78 - 163/68)  BP(mean): --  ABP: --  ABP(mean): --  RR: 18 (13 Jul 2023 04:47) (18 - 18)  SpO2: 96% (13 Jul 2023 04:47) (95% - 96%)    O2 Parameters below as of 13 Jul 2023 04:47  Patient On (Oxygen Delivery Method): room air                 Input and Output:  I&O's Detail    12 Jul 2023 07:01  -  13 Jul 2023 05:53  --------------------------------------------------------  IN:    Heparin Infusion: 241 mL  Total IN: 241 mL    OUT:  Total OUT: 0 mL    Total NET: 241 mL          Lab Data                        12.9   9.50  )-----------( 239      ( 13 Jul 2023 01:16 )             38.6     07-12    137  |  102  |  16  ----------------------------<  209<H>  3.7   |  30  |  0.89    Ca    8.9      12 Jul 2023 08:45    TPro  6.9  /  Alb  2.7<L>  /  TBili  0.7  /  DBili  x   /  AST  35  /  ALT  30  /  AlkPhos  53  07-12            Review of Systems	      Objective     Physical Examination    heart s1s2  lung dc bS  head nc  head at      Pertinent Lab findings & Imaging      Silverio:  NO   Adequate UO     I&O's Detail    12 Jul 2023 07:01  -  13 Jul 2023 05:53  --------------------------------------------------------  IN:    Heparin Infusion: 241 mL  Total IN: 241 mL    OUT:  Total OUT: 0 mL    Total NET: 241 mL               Discussed with:     Cultures:	        Radiology

## 2023-07-13 NOTE — PROGRESS NOTE ADULT - SUBJECTIVE AND OBJECTIVE BOX
Patient is a 58y old  Male who presents with a chief complaint of rigors, back pain (13 Jul 2023 10:01)      INTERVAL HPI/OVERNIGHT EVENTS: noted  pt seen and examined this am   events noted      Vital Signs Last 24 Hrs  T(C): 37.6 (13 Jul 2023 15:30), Max: 37.6 (13 Jul 2023 15:30)  T(F): 99.7 (13 Jul 2023 15:30), Max: 99.7 (13 Jul 2023 15:30)  HR: 74 (13 Jul 2023 16:30) (74 - 89)  BP: 161/74 (13 Jul 2023 16:30) (142/72 - 178/91)  BP(mean): 107 (13 Jul 2023 16:30) (101 - 110)  RR: 17 (13 Jul 2023 16:30) (11 - 20)  SpO2: 97% (13 Jul 2023 16:30) (93% - 97%)    Parameters below as of 13 Jul 2023 16:30  Patient On (Oxygen Delivery Method): room air        acetaminophen     Tablet .. 650 milliGRAM(s) Oral every 6 hours PRN  aluminum hydroxide/magnesium hydroxide/simethicone Suspension 30 milliLiter(s) Oral every 4 hours PRN  amLODIPine   Tablet 5 milliGRAM(s) Oral daily  aspirin enteric coated 81 milliGRAM(s) Oral daily  atorvastatin 40 milliGRAM(s) Oral at bedtime  bisacodyl 5 milliGRAM(s) Oral every 12 hours PRN  dextrose 5%. 1000 milliLiter(s) IV Continuous <Continuous>  dextrose 5%. 1000 milliLiter(s) IV Continuous <Continuous>  dextrose 50% Injectable 12.5 Gram(s) IV Push once  dextrose 50% Injectable 25 Gram(s) IV Push once  dextrose 50% Injectable 25 Gram(s) IV Push once  dextrose Oral Gel 15 Gram(s) Oral once PRN  finasteride 5 milliGRAM(s) Oral daily  glucagon  Injectable 1 milliGRAM(s) IntraMuscular once  HYDROmorphone  Injectable 0.5 milliGRAM(s) IV Push every 4 hours PRN  insulin glargine Injectable (LANTUS) 8 Unit(s) SubCutaneous at bedtime  insulin lispro (ADMELOG) corrective regimen sliding scale   SubCutaneous three times a day before meals  insulin lispro Injectable (ADMELOG) 3 Unit(s) SubCutaneous three times a day before meals  ketorolac   Injectable 30 milliGRAM(s) IV Push every 6 hours PRN  melatonin 3 milliGRAM(s) Oral at bedtime PRN  ondansetron Injectable 4 milliGRAM(s) IV Push every 8 hours PRN  tamsulosin 0.4 milliGRAM(s) Oral at bedtime      PHYSICAL EXAM:  GENERAL: NAD,   EYES: conjunctiva and sclera clear  ENMT: Moist mucous membranes  NECK: Supple, No JVD, Normal thyroid  CHEST/LUNG: non labored, cta b/l  HEART: Regular rate and rhythm; No murmurs, rubs, or gallops  ABDOMEN: Soft, Nontender, Nondistended; Bowel sounds present  EXTREMITIES:  2+ Peripheral Pulses, No clubbing, cyanosis, or edema  LYMPH: No lymphadenopathy noted  SKIN: No rashes or lesions    Consultant(s) Notes Reviewed:  [x ] YES  [ ] NO  Care Discussed with Consultants/Other Providers [ x] YES  [ ] NO    LABS:                        11.9   8.20  )-----------( 232      ( 13 Jul 2023 08:25 )             36.7     07-13    140  |  106  |  14  ----------------------------<  173<H>  3.7   |  28  |  0.76    Ca    8.5      13 Jul 2023 08:25  Phos  3.4     07-13  Mg     1.8     07-13    TPro  6.9  /  Alb  2.7<L>  /  TBili  0.7  /  DBili  x   /  AST  35  /  ALT  30  /  AlkPhos  53  07-12    PT/INR - ( 13 Jul 2023 08:25 )   PT: 16.5 sec;   INR: 1.41 ratio         PTT - ( 13 Jul 2023 08:25 )  PTT:135.4 sec  Urinalysis Basic - ( 13 Jul 2023 08:25 )    Color: x / Appearance: x / SG: x / pH: x  Gluc: 173 mg/dL / Ketone: x  / Bili: x / Urobili: x   Blood: x / Protein: x / Nitrite: x   Leuk Esterase: x / RBC: x / WBC x   Sq Epi: x / Non Sq Epi: x / Bacteria: x      CAPILLARY BLOOD GLUCOSE      POCT Blood Glucose.: 165 mg/dL (13 Jul 2023 12:09)  POCT Blood Glucose.: 201 mg/dL (13 Jul 2023 09:48)  POCT Blood Glucose.: 186 mg/dL (13 Jul 2023 07:48)  POCT Blood Glucose.: 161 mg/dL (12 Jul 2023 21:47)        Urinalysis Basic - ( 13 Jul 2023 08:25 )    Color: x / Appearance: x / SG: x / pH: x  Gluc: 173 mg/dL / Ketone: x  / Bili: x / Urobili: x   Blood: x / Protein: x / Nitrite: x   Leuk Esterase: x / RBC: x / WBC x   Sq Epi: x / Non Sq Epi: x / Bacteria: x        Culture - Blood (collected 11 Jul 2023 12:21)  Source: .Blood Blood-Venous  Preliminary Report (12 Jul 2023 20:01):    No growth at 24 hours    Culture - Blood (collected 11 Jul 2023 12:19)  Source: .Blood Blood-Peripheral  Preliminary Report (12 Jul 2023 20:01):    No growth at 24 hours    Babesia microti PCR, Bld (collected 11 Jul 2023 06:40)        RADIOLOGY & ADDITIONAL TESTS:    Imaging Personally Reviewed:  [x ] YES  [ ] NO

## 2023-07-13 NOTE — PROGRESS NOTE ADULT - ASSESSMENT
58-year-old male with history of diabetes, hypertension, BPH, hyperlipidemia - presents with FEVER - Back Pain - Dyspnea - Weakness    suspect FERNY  Obesity  HTN  BPH  hx of DVT PE  DM  HLD  Nephrolithiasis - Bladder Stones -     ID f/u  plan for Card Cath  vs noted  labs reviewed    CT renal stone hunt noted -   ID and Cardio eval  DOAC for VTE - as per home rx regimen  FERNY eval as outpatient - weight management and sleep hygiene review - discussed  monitor VS and HD and Sat  UA - Cx - Biomarkers -   ACAP prn for Fever  DM care - serial FS -   recent visit on 7/8 - CT neg for PE, LE doppler neg for DVT, ABG noted

## 2023-07-13 NOTE — CHART NOTE - NSCHARTNOTEFT_GEN_A_CORE
Post Diagnostic Cardiac Catheterization Chart Note      Prelim cath report:  minor luminal irregularities  full/official report to follow      Patient without complaints. Denies CP, SOB, palpitations, N/V, fever/chills, abd pain, numbness/tingling/weakness, other c/o at this time.    RRA access site stable (clean, dry, intact, without bleeding, heat, erythema, or hematoma). Radial band in place.  RUE motor, neuro, circ intact.  Hemodynamically stable, neurologically intact, VS stable, afebrile    A/P: s/p diagnostic C  further plan including d/c per primary team/non-interventional cardiology

## 2023-07-13 NOTE — PROGRESS NOTE ADULT - ASSESSMENT
58m presenting with rigors, high fevers, lower back pain and rising troponin    #FUO  CT with large prostate and bladder stones  mild pyuria on UA  no leukocytosis  radiculopathy? ct ls spine- unremarkable  ? MRI if remains febrile and symptomatic  PSA, crp, and procal markedly elevated unclear significance  ID, pulm and urology following  no clear source of infection  cultures NGTD    rising troponin  cardio following  continue telemetry  asa  sp cath- nonobstructive coronaries, some luminal irregularities    dm  diabetic diet  fingersticks  sliding scale  basal bolus as needed   on weight loss  hold oral meds    BPH  continue flomax and proscar    HTN/HLD  continue norvasc and lipitor    [facility]   [phone]

## 2023-07-13 NOTE — PROGRESS NOTE ADULT - ASSESSMENT
58 year old male with PMH DM type-2, HTN, HLD, PE/DVT on Xarelto BIBEMS presenting with rigors, high fevers, lower back pain and rising troponin. CT with large prostate and bladder stones.      ACS w/ elevated troponins (peak 1148).    cardiac cath today (7/13)  NPO since after MN  received to cath lab on heparin  continue aspirin and statin  last xarelto 7/11 @ 5pm  ID clearance appreciated  remainder of plan per primary team/non-interventional cardiology     Adjusted CathPCI Bleeding Event Risk: 1.0%

## 2023-07-13 NOTE — PROGRESS NOTE ADULT - SUBJECTIVE AND OBJECTIVE BOX
Department of Cardiology                                                               Division of Interventional Cardiology                                                               [facility] / [address] [address]                                                                                 [phone]                                                                                                                                       Interventional Cardiology Pre-Procedure Note    Recent/pertinent 24 hour events: no significant overnight events    Subjective/ROS:   Denies CP, SOB, palpitations, N/V, fever/chills, abd pain, numbness/tingling/weakness, other c/o at this time.  ROS negative x 10 systems except as documented as above.    HPI:  58M with hx of PE/DVT 2022, DM, HTN, BPH, HLD presents with rigors and sob. symptoms started on friday, he was evaluated in ED and sent home. it recurred again  overnight and he returned. He complains of lower back pain in the middle just above his buttocks and is writhing in pain.   (10 Jul 2023 12:09)      PAST MEDICAL & SURGICAL HISTORY:  Type 2 diabetes mellitus      Hyperlipidemia      Hypertension      History of colonoscopy  ()        FAMILY HISTORY:  Family history of stroke (Father)    Family history of dementia (Mother)    Family history of Alzheimer's disease (Sibling)      Social History:      Symptoms:        Angina (Class):        Ischemic Symptoms:     Heart Failure:        Systolic/Diastolic/Combined:        Acute/chronic/acute on chronic:       NYHA Class (within 2 weeks):     Echo:     Assessment of LVEF:       EF:        Date:        By echo/cath/etc:             Noninvasive Testing:     Stress Test: Date:        Protocol:        Duration of Exercise:        Symptoms:        EKG Changes:        DTS:        Myocardial Imaging:        Risk Assessment (Low, Medium, High):     Cardiac CTA:      Prior Cardiac Interventions:    Associated Risk Factors:        Frailty Screening: (N/A, mild, mod, severe)       Cerebrovascular Disease: N/A       Chronic Lung Disease: N/A       Peripheral Arterial Disease: N/A       Chronic Kidney Disease (if yes, what is GFR): N/A       Uncontrolled Diabetes (if yes, what is HgbA1C or FBS): N/A       Poorly Controlled Hypertension (if yes, what is SBP): N/A       Morbid Obesity (if yes, what is BMI): N/A       History of Recent Ventricular Arrhythmia: N/A       Inability to Ambulate Safely: N/A       Need for Therapeutic Anticoagulation: N/A       Antiplatelet or Contrast Allergy: N/A    Antianginal Therapies:        Beta Blockers:         Calcium Channel Blockers:        Nitrates:        Ranexa:     MEDICATIONS  (STANDING):  amLODIPine   Tablet 5 milliGRAM(s) Oral daily  aspirin enteric coated 81 milliGRAM(s) Oral daily  atorvastatin 40 milliGRAM(s) Oral at bedtime  dextrose 5%. 1000 milliLiter(s) (50 mL/Hr) IV Continuous <Continuous>  dextrose 5%. 1000 milliLiter(s) (100 mL/Hr) IV Continuous <Continuous>  dextrose 50% Injectable 12.5 Gram(s) IV Push once  dextrose 50% Injectable 25 Gram(s) IV Push once  dextrose 50% Injectable 25 Gram(s) IV Push once  finasteride 5 milliGRAM(s) Oral daily  glucagon  Injectable 1 milliGRAM(s) IntraMuscular once  heparin  Infusion.  Unit(s)/Hr (21 mL/Hr) IV Continuous <Continuous>  insulin glargine Injectable (LANTUS) 8 Unit(s) SubCutaneous at bedtime  insulin lispro (ADMELOG) corrective regimen sliding scale   SubCutaneous three times a day before meals  insulin lispro Injectable (ADMELOG) 3 Unit(s) SubCutaneous three times a day before meals  tamsulosin 0.4 milliGRAM(s) Oral at bedtime    MEDICATIONS  (PRN):  acetaminophen     Tablet .. 650 milliGRAM(s) Oral every 6 hours PRN Temp greater or equal to 38C (100.4F), Mild Pain (1 - 3)  aluminum hydroxide/magnesium hydroxide/simethicone Suspension 30 milliLiter(s) Oral every 4 hours PRN Dyspepsia  bisacodyl 5 milliGRAM(s) Oral every 12 hours PRN Constipation  dextrose Oral Gel 15 Gram(s) Oral once PRN Blood Glucose LESS THAN 70 milliGRAM(s)/deciliter  heparin   Injectable 4500 Unit(s) IV Push every 6 hours PRN For aPTT between 40 - 57  heparin   Injectable 9500 Unit(s) IV Push every 6 hours PRN For aPTT less than 40  HYDROmorphone  Injectable 0.5 milliGRAM(s) IV Push every 4 hours PRN Severe Pain (7 - 10)  ketorolac   Injectable 30 milliGRAM(s) IV Push every 6 hours PRN Moderate Pain (4 - 6)  melatonin 3 milliGRAM(s) Oral at bedtime PRN Insomnia  ondansetron Injectable 4 milliGRAM(s) IV Push every 8 hours PRN Nausea and/or Vomiting      Allergies: No Known Allergies        T(C): 37.5 (07-13-23 @ 09:32), Max: 37.5 (23 @ 09:32)  HR: 86 (23 @ 09:32) (78 - 89)  BP: 163/85 (23 @ 09:32) (156/78 - 178/91)  RR: 20 (23 @ 09:32) (18 - 20)  SpO2: 93% (23 @ 09:29) (93% - 96%)  Wt(kg): --    I&O's Summary    2023 07:01  -  2023 07:00  --------------------------------------------------------  IN: 241 mL / OUT: 0 mL / NET: 241 mL        Daily Height in cm: 167.64 (2023 09:32)    Daily Weight in k (2023 04:47)      TELEMETRY: 	      ECG:  	  ecg      LABS:	 	                        11.9   8.20  )-----------( 232      ( 2023 08:25 )             36.7     07-13    140  |  106  |  14  ----------------------------<  173<H>  3.7   |  28  |  0.76    Ca    8.5      2023 08:25  Phos  3.4     07-13  Mg     1.8     07-13    TPro  6.9  /  Alb  2.7<L>  /  TBili  0.7  /  DBili  x   /  AST  35  /  ALT  30  /  AlkPhos  53  07-12                      Physical Exam:  Constitutional: NAD  Neuro: A+O x 3, non-focal, speech clear and intact  HEENT: NC/AT, PERRL, EOMI, anicteric sclerae, oral mucosa pink and moist  Neck: supple, no JVD  CV: regular rate, regular rhythm, +S1S2, no murmurs or rub  Pulm/chest: lung sounds CTA and equal bilaterally, no accessory muscle use noted  Abd: soft, NT, ND, +BS  Ext: ALSTON x 4, no C/C/E  Pulses: R radial 2+, R femoral 2+, bilat DP 2+  Skin: warm, well perfused  Psych: calm, appropriate affect                                                                                 Department of Cardiology                                                               Division of Interventional Cardiology                                                               [facility] / [address] [address]                                                                                 [phone]                                                                                                                                       Interventional Cardiology Pre-Procedure Note    Recent/pertinent 24 hour events: no significant overnight events    Subjective/ROS: feeling well, no c/o offered. Denies CP, SOB, palpitations, N/V, fever/chills, abd pain, numbness/tingling/weakness, other c/o at this time.  ROS negative x 10 systems except as documented as above.      HPI:  58M with hx of PE/DVT 2022, DM, HTN, BPH, HLD presents with rigors and sob. symptoms started on friday, he was evaluated in ED and sent home. it recurred again  overnight and he returned. He complains of lower back pain in the middle just above his buttocks and is writhing in pain.   (10 Jul 2023 12:09)      PAST MEDICAL & SURGICAL HISTORY:  Type 2 diabetes mellitus  Hyperlipidemia  Hypertension  History of colonoscopy, ()    FAMILY HISTORY:  Family history of stroke (Father)  Family history of dementia (Mother)  Family history of Alzheimer's disease (Sibling)    Marital status:   Lives with: wife and 2 kids (21 and 16)  Occupation: owns toy making company  ADLs: independent  Assistive Devices: none    Tobacco use: denies/never  Alcohol use: rarely  Illicit drug use: denies    Code status: full code  HCP/surrogate: wife           Prior Cardiac Interventions: none      MEDICATIONS  (STANDING):  amLODIPine   Tablet 5 milliGRAM(s) Oral daily  aspirin enteric coated 81 milliGRAM(s) Oral daily  atorvastatin 40 milliGRAM(s) Oral at bedtime  finasteride 5 milliGRAM(s) Oral daily  heparin  Infusion.  Unit(s)/Hr (21 mL/Hr) IV Continuous   insulin glargine Injectable (LANTUS) 8 Unit(s) SubCutaneous at bedtime  insulin lispro (ADMELOG) corrective regimen sliding scale   SubCutaneous three times a day before meals  insulin lispro Injectable (ADMELOG) 3 Unit(s) SubCutaneous three times a day before meals  tamsulosin 0.4 milliGRAM(s) Oral at bedtime    MEDICATIONS  (PRN):  acetaminophen     Tablet .. 650 milliGRAM(s) Oral every 6 hours PRN Temp greater or equal to 38C (100.4F), Mild Pain (1 - 3)  aluminum hydroxide/magnesium hydroxide/simethicone Suspension 30 milliLiter(s) Oral every 4 hours PRN Dyspepsia  bisacodyl 5 milliGRAM(s) Oral every 12 hours PRN Constipation  dextrose Oral Gel 15 Gram(s) Oral once PRN Blood Glucose LESS THAN 70 milliGRAM(s)/deciliter  heparin   Injectable 4500 Unit(s) IV Push every 6 hours PRN For aPTT between 40 - 57  heparin   Injectable 9500 Unit(s) IV Push every 6 hours PRN For aPTT less than 40  HYDROmorphone  Injectable 0.5 milliGRAM(s) IV Push every 4 hours PRN Severe Pain (7 - 10)  ketorolac   Injectable 30 milliGRAM(s) IV Push every 6 hours PRN Moderate Pain (4 - 6)  melatonin 3 milliGRAM(s) Oral at bedtime PRN Insomnia  ondansetron Injectable 4 milliGRAM(s) IV Push every 8 hours PRN Nausea and/or Vomiting    No Known Allergies      T(C): 37.5 (23 @ 09:32), Max: 37.5 (23 @ 09:32)  HR: 86 (23 @ 09:32) (78 - 89), NSR  BP: 163/85 (23 @ 09:32) (156/78 - 178/91)  RR: 20 (23 @ 09:32) (18 - 20)  SpO2: 93% (23 @ 09:29) (93% - 96%)    Daily Height in cm: 167.64 (2023 09:32)    Daily Weight in k (2023 04:47)    I&O's Summary    2023 07:01  -  2023 07:00  --------------------------------------------------------  IN: 241 mL / OUT: 0 mL / NET: 241 mL      TELEMETRY: NSR 70s    < from: 12 Lead ECG (07.10.23 @ 06:35) >  Ventricular Rate 93 BPM  Atrial Rate 93 BPM  P-R Interval 134 ms  QRS Duration 84 ms  Q-T Interval 372 ms  QTC Calculation(Bazett) 462 ms  P Axis 30 degrees  R Axis 1 degrees  T Axis 27 degrees  Diagnosis Line Normal sinus rhythm  Normal ECG  When compared with ECG of 10-JUL-2023 01:31, (Unconfirmed)  ST no longer depressed in Anterior leads  < end of copied text >      LABS:	 	                        11.9   8.20  )-----------( 232      ( 2023 08:25 )             36.7     07-13    140  |  106  |  14  ----------------------------<  173<H>  3.7   |  28  |  0.76    Ca    8.5      2023 08:25  Phos  3.4     07-13  Mg     1.8       TPro  6.9  /  Alb  2.7<L>  /  TBili  0.7  /  DBili  x   /  AST  35  /  ALT  30  /  AlkPhos  53  07-12    Activated Partial Thromboplastin Time (13.23 @ 01:16)    Activated Partial Thromboplastin Time: 38.8: sec    Troponin I, High Sensitivity (07.10.23 @ 13:15) 741.2 ng/L  Troponin I, High Sensitivity (07.10.23 @ 05:26) 1148.3 ng/L  Troponin I, High Sensitivity (07.10.23 @ 01:30) 364.5 ng/L  Troponin I, High Sensitivity (07.08.23 @ 12:40) 237.4 ng/L  Troponin I, High Sensitivity (07.08. @ 07:50) 228.1 ng/L  Troponin I, High Sensitivity (07.08.23 @ 04:10) 88.2 ng/L    A1C with Estimated Average Glucose (07.11. @ 06:40)    A1C with Estimated Average Glucose Result: 8.8 mg/dL    Blood cultures reviewed: all from this admission no growth to date      Physical Exam:  Constitutional: NAD  Neuro: A+O x 3, non-focal, speech clear and intact  HEENT: NC/AT, PERRL, EOMI, anicteric sclerae, oral mucosa pink and moist  Neck: supple, no JVD  CV: regular rate, regular rhythm, +S1S2, no murmurs or rub  Pulm/chest: lung sounds CTA and equal bilaterally, no accessory muscle use noted  Abd: soft, NT, ND, +BS, obese  Ext: ALSTON x 4, no C/C/E  Pulses: R radial 2+, bilat DP 2+  Skin: warm, well perfused  Psych: calm, appropriate affect

## 2023-07-14 ENCOUNTER — TRANSCRIPTION ENCOUNTER (OUTPATIENT)
Age: 58
End: 2023-07-14

## 2023-07-14 VITALS
HEART RATE: 87 BPM | RESPIRATION RATE: 24 BRPM | SYSTOLIC BLOOD PRESSURE: 170 MMHG | OXYGEN SATURATION: 95 % | DIASTOLIC BLOOD PRESSURE: 87 MMHG

## 2023-07-14 LAB
A PHAGOCYTOPH IGG TITR SER IF: SIGNIFICANT CHANGE UP TITER
B BURGDOR AB SER QL IA: POSITIVE — SIGNIFICANT CHANGE UP
B MICROTI IGG TITR SER: SIGNIFICANT CHANGE UP TITER
E CHAFFEENSIS IGG TITR SER IF: SIGNIFICANT CHANGE UP TITER
HCT VFR BLD CALC: 38.2 % — LOW (ref 39–50)
HGB BLD-MCNC: 12.7 G/DL — LOW (ref 13–17)
MCHC RBC-ENTMCNC: 29.2 PG — SIGNIFICANT CHANGE UP (ref 27–34)
MCHC RBC-ENTMCNC: 33.2 GM/DL — SIGNIFICANT CHANGE UP (ref 32–36)
MCV RBC AUTO: 87.8 FL — SIGNIFICANT CHANGE UP (ref 80–100)
NRBC # BLD: 0 /100 WBCS — SIGNIFICANT CHANGE UP (ref 0–0)
PLASMODIUM DNA BLD QL NAA+NON-PROBE: NEGATIVE — SIGNIFICANT CHANGE UP
PLATELET # BLD AUTO: 280 K/UL — SIGNIFICANT CHANGE UP (ref 150–400)
RBC # BLD: 4.35 M/UL — SIGNIFICANT CHANGE UP (ref 4.2–5.8)
RBC # FLD: 12.8 % — SIGNIFICANT CHANGE UP (ref 10.3–14.5)
WBC # BLD: 8.03 K/UL — SIGNIFICANT CHANGE UP (ref 3.8–10.5)
WBC # FLD AUTO: 8.03 K/UL — SIGNIFICANT CHANGE UP (ref 3.8–10.5)

## 2023-07-14 PROCEDURE — 82962 GLUCOSE BLOOD TEST: CPT

## 2023-07-14 PROCEDURE — G0103: CPT

## 2023-07-14 PROCEDURE — 87086 URINE CULTURE/COLONY COUNT: CPT

## 2023-07-14 PROCEDURE — 86803 HEPATITIS C AB TEST: CPT

## 2023-07-14 PROCEDURE — 86753 PROTOZOA ANTIBODY NOS: CPT

## 2023-07-14 PROCEDURE — C1894: CPT

## 2023-07-14 PROCEDURE — 85610 PROTHROMBIN TIME: CPT

## 2023-07-14 PROCEDURE — 72132 CT LUMBAR SPINE W/DYE: CPT

## 2023-07-14 PROCEDURE — 84100 ASSAY OF PHOSPHORUS: CPT

## 2023-07-14 PROCEDURE — 80048 BASIC METABOLIC PNL TOTAL CA: CPT

## 2023-07-14 PROCEDURE — 93306 TTE W/DOPPLER COMPLETE: CPT

## 2023-07-14 PROCEDURE — 86140 C-REACTIVE PROTEIN: CPT

## 2023-07-14 PROCEDURE — 86618 LYME DISEASE ANTIBODY: CPT

## 2023-07-14 PROCEDURE — 36415 COLL VENOUS BLD VENIPUNCTURE: CPT

## 2023-07-14 PROCEDURE — 84484 ASSAY OF TROPONIN QUANT: CPT

## 2023-07-14 PROCEDURE — 86611 BARTONELLA ANTIBODY: CPT

## 2023-07-14 PROCEDURE — 85652 RBC SED RATE AUTOMATED: CPT

## 2023-07-14 PROCEDURE — 83036 HEMOGLOBIN GLYCOSYLATED A1C: CPT

## 2023-07-14 PROCEDURE — 87798 DETECT AGENT NOS DNA AMP: CPT

## 2023-07-14 PROCEDURE — 99285 EMERGENCY DEPT VISIT HI MDM: CPT | Mod: 25

## 2023-07-14 PROCEDURE — 87471 BARTONELLA DNA AMP PROBE: CPT

## 2023-07-14 PROCEDURE — 87040 BLOOD CULTURE FOR BACTERIA: CPT

## 2023-07-14 PROCEDURE — 83735 ASSAY OF MAGNESIUM: CPT

## 2023-07-14 PROCEDURE — 80053 COMPREHEN METABOLIC PANEL: CPT

## 2023-07-14 PROCEDURE — 84145 PROCALCITONIN (PCT): CPT

## 2023-07-14 PROCEDURE — 85730 THROMBOPLASTIN TIME PARTIAL: CPT

## 2023-07-14 PROCEDURE — 74176 CT ABD & PELVIS W/O CONTRAST: CPT | Mod: MA

## 2023-07-14 PROCEDURE — 86638 Q FEVER ANTIBODY: CPT

## 2023-07-14 PROCEDURE — 83605 ASSAY OF LACTIC ACID: CPT

## 2023-07-14 PROCEDURE — C1887: CPT

## 2023-07-14 PROCEDURE — 93458 L HRT ARTERY/VENTRICLE ANGIO: CPT

## 2023-07-14 PROCEDURE — 85027 COMPLETE CBC AUTOMATED: CPT

## 2023-07-14 PROCEDURE — C1769: CPT

## 2023-07-14 PROCEDURE — 81001 URINALYSIS AUTO W/SCOPE: CPT

## 2023-07-14 PROCEDURE — 85025 COMPLETE CBC W/AUTO DIFF WBC: CPT

## 2023-07-14 PROCEDURE — 96361 HYDRATE IV INFUSION ADD-ON: CPT

## 2023-07-14 PROCEDURE — 96365 THER/PROPH/DIAG IV INF INIT: CPT

## 2023-07-14 PROCEDURE — 99232 SBSQ HOSP IP/OBS MODERATE 35: CPT

## 2023-07-14 PROCEDURE — 86666 EHRLICHIA ANTIBODY: CPT

## 2023-07-14 PROCEDURE — 93005 ELECTROCARDIOGRAM TRACING: CPT

## 2023-07-14 PROCEDURE — 0225U NFCT DS DNA&RNA 21 SARSCOV2: CPT

## 2023-07-14 RX ORDER — SEMAGLUTIDE 0.68 MG/ML
1 INJECTION, SOLUTION SUBCUTANEOUS
Refills: 0 | DISCHARGE

## 2023-07-14 RX ORDER — ASPIRIN/CALCIUM CARB/MAGNESIUM 324 MG
1 TABLET ORAL
Qty: 0 | Refills: 0 | DISCHARGE
Start: 2023-07-14

## 2023-07-14 RX ORDER — VALSARTAN 80 MG/1
1 TABLET ORAL
Qty: 0 | Refills: 0 | DISCHARGE

## 2023-07-14 RX ADMIN — FINASTERIDE 5 MILLIGRAM(S): 5 TABLET, FILM COATED ORAL at 11:58

## 2023-07-14 RX ADMIN — Medication 30 MILLIGRAM(S): at 07:41

## 2023-07-14 RX ADMIN — Medication 81 MILLIGRAM(S): at 11:58

## 2023-07-14 RX ADMIN — Medication 3 UNIT(S): at 11:59

## 2023-07-14 RX ADMIN — Medication 3 UNIT(S): at 08:20

## 2023-07-14 RX ADMIN — Medication 1: at 08:24

## 2023-07-14 RX ADMIN — Medication 1: at 11:58

## 2023-07-14 RX ADMIN — AMLODIPINE BESYLATE 5 MILLIGRAM(S): 2.5 TABLET ORAL at 05:43

## 2023-07-14 RX ADMIN — Medication 30 MILLIGRAM(S): at 08:00

## 2023-07-14 NOTE — DISCHARGE NOTE NURSING/CASE MANAGEMENT/SOCIAL WORK - PATIENT PORTAL LINK FT
You can access the FollowMyHealth Patient Portal offered by [facility] by registering at the following website: [email or web address]. By joining [email or web address] FollowMyHealth portal, you will also be able to view your health information using other applications (apps) compatible with our system.

## 2023-07-14 NOTE — PROGRESS NOTE ADULT - ASSESSMENT
A/P: s/p diagnostic LHC, RRA access site stable without any bleeding or hematoma formation  further plan including d/c per primary team/non-interventional cardiology.  Pt education related to:  Restricted use with no heavy lifting of affected arm for 48 hours.  No submerging the arm in water for 48 hours.  You may start showering today.  Call your doctor for any bleeding, swelling, loss of sensation in the hand or fingers, or fingers turning blue.  If heavy bleeding or large lumps form, hold pressure at the spot and come to the Emergency Room.  Lifestyle modifications inclusive of diet, exercise, medication adherance, appt compliance

## 2023-07-14 NOTE — DISCHARGE NOTE PROVIDER - PROVIDER TOKENS
PROVIDER:[TOKEN:[[id number]],FOLLOWUP:[1 week]],PROVIDER:[TOKEN:[[id number]],FOLLOWUP:[1 week]]

## 2023-07-14 NOTE — DISCHARGE NOTE PROVIDER - CARE PROVIDER_API CALL
Jacob Galvez  Family Medicine  [address] [facility], [address]  [place] [address]  Phone: [phone]  Fax: [phone]  Follow Up Time: 1 week    Alfred Canela  Infectious Disease  [address] [place] [address]  Phone: [phone]  Fax: [phone]  Follow Up Time: 1 week

## 2023-07-14 NOTE — PROGRESS NOTE ADULT - NS ATTEND AMEND GEN_ALL_CORE FT
57 y/o male h/o DM type-2 HTN HLD, PE/DVT on Xarelto BIBEMS  with shortness of breath.    NSTEMI  - He has a known PE/DVT and on Xarelto  - CTPA neg of PE.  SOB can be his anginal equivalent  - CE's elevated and hsT peaked to 1100 and downtrended to 700's  - EKG without sig ischemic changes.  Would obtain EKG for CP   - Follow up TTE  - Plan for cardiac cath, TBD.  This was discussed with patient and he is amenable  - Would start ASA 81 mg and increase statin to 40 mg    - PT with recurrent fevers and elevated lactate on presentation likely infectious in nature.  However, infection w/u negative  - ID following; off Abx.  Lumbar imaging unremarkable  - Can continue IV fluids  - Compensated from HF POV.     -  eval noted  - No further w/u or intervention    - Would hold Xarelto (for DVT/PE) in anticipation of cardiac cath.  This was discussed with Primary, Dr. Bland  - Can start Heparin gtt tonight instead  of Xarelto dosse    - Monitor and replete lytes, keep K>4, Mg>2.  - Will continue to follow.
I have personally seen and examined the patient in detail.  I have spoken to the provider regarding the assessment and plan of care.  I have made changes to the note accordingly.
hx of dvt and pe  dm, htn chol and elevated trop in the setting of fevers  no clear source of infection  cath with minor luminal irreg despite the trop-to be managed medically
hx of dvt and pe  dm, htn chol and elevated trop in the setting of fevers  no clear source of infection  cath with minor luminal irreg despite the trop-to be managed medically.  restart ac  dc planning

## 2023-07-14 NOTE — PROGRESS NOTE ADULT - ASSESSMENT
58-year-old male with history of diabetes, hypertension, BPH, hyperlipidemia - presents with FEVER - Back Pain - Dyspnea - Weakness    suspect FERNY  Obesity  HTN  BPH  hx of DVT PE  DM  HLD  Nephrolithiasis - Bladder Stones -     ID f/u  s/p left Heart Cath  vs noted  labs reviewed    CT renal stone hunt noted -   ID and Cardio eval  DOAC for VTE - as per home rx regimen - resume when ok with Interventional Cardio  FERNY eval as outpatient - weight management and sleep hygiene review - discussed  monitor VS and HD and Sat  UA - Cx - Biomarkers -   ACAP prn for Fever  DM care - serial FS -   recent visit on 7/8 - CT neg for PE, LE doppler neg for DVT, ABG noted

## 2023-07-14 NOTE — PROGRESS NOTE ADULT - ASSESSMENT
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 Symptoms started on friday, he was evaluated in ED and sent home. it recurred again 7/9 overnight and he returned. He complains of lower back pain in the middle just above his buttocks and several transient episodes of dyspnea    RECOMMENDATIONS  Presentation suggesting an acute infectious process and concerning with point tenderness over spine. Pt is clinically stable and dose of ceftriaxone given in ER 7/10.   Recommend continued evaluation to clarify etiology  -imaging of lumbar sacral spine noted  -blood and urine cultures - NGTD  -TTE planned  -will send additional testing  -clinically stable to obs off abx  -as we are observing pt off abx fine with dc as along as we follow up next week.    From an ID standpoint no further requirement for inpatient status for the management of ID issues. Fine with discharge from ID standpoint when other medical issues no longer require inpatient care and social issues allow for a safe discharge plan. To schedule an outpatient ID follow up appointment please call our office at [phone]      Thank you for consulting us and involving us in the management of this most interesting and challenging case.  We will follow along in the care of this patient. Please call us at [phone] or text me directly on my cell# at [phone] with any concerns.

## 2023-07-14 NOTE — PROGRESS NOTE ADULT - PROVIDER SPECIALTY LIST ADULT
Cardiology
Internal Medicine
Intervent Cardiology
Pulmonology
Cardiology
Cardiology
Infectious Disease
Intervent Cardiology
Pulmonology
Cardiology
Hospitalist
Internal Medicine
Pulmonology
Pulmonology

## 2023-07-14 NOTE — DISCHARGE NOTE PROVIDER - NPI NUMBER (FOR SYSADMIN USE ONLY) :
Patient informed of below. Expressed understanding.     Requesting new Warfarin Rx to [facility].  Joie Barajas CMA, 05/03/22, 4:21 PM [[id number]],[[id number]]

## 2023-07-14 NOTE — PROGRESS NOTE ADULT - ASSESSMENT
57 y/o male h/o DM type-2 HTN HLD, PE/DVT on Xarelto BIBEMS  with shortness of breath.    NSTEMI vs Demand Ischemia/DVT and PE  - CE's elevated and hsT peaked to 1100; downtrended to 700's  - EKG without sig ischemic changes.     - s/p diagnostic LHC via RRA (7/13)  - continue ASA and statin     - He has a known PE/DVT and on Xarelto.  Last dose 7/11  - CTPA neg of PE.  - was on Heparin gtt, would resume Xarelto when appropriate    - afebrile overnight, infection w/u negative  - ID following; off Abx.  Lumbar imaging unremarkable.  Repeat blood C/s pending.   - Compensated from HF POV, though, with trace BLE edema    - Monitor and replete Lytes. Keep K > 4 and Mg > 2  - educated regarding healthy lifestyle management ie losing wt, follow up PCP, and cardiologist post discharge   - Will continue to follow.    Chantel Watson [facility]  Nurse Practitioner - Cardiology   call TEAMS

## 2023-07-14 NOTE — PROGRESS NOTE ADULT - SUBJECTIVE AND OBJECTIVE BOX
[facility] DIVISION of INFECTIOUS DISEASE  Alfred Canela MD PhD, Leila Gamez MD, Mei Adams MD, Garth Mensah MD, Sg Payne MD  and providing coverage with Lizzeth Toledo MD  Providing Infectious Disease Consultations at [facility], [facility], [facility]'s    Office# [phone] to schedule follow up appointments  Answering Service for urgent calls or New Consults [phone]  Cell# to text for urgent issues Alfred Canela [phone]     infectious diseases progress note:    GEORGIE CANELA is a 58y y. o. Male patient    Overnight and events of the last 24hrs reviewed    Allergies    No Known Allergies    Intolerances        ANTIBIOTICS/RELEVANT:  antimicrobials    immunologic:    OTHER:  acetaminophen     Tablet .. 650 milliGRAM(s) Oral every 6 hours PRN  aluminum hydroxide/magnesium hydroxide/simethicone Suspension 30 milliLiter(s) Oral every 4 hours PRN  amLODIPine   Tablet 5 milliGRAM(s) Oral daily  aspirin enteric coated 81 milliGRAM(s) Oral daily  atorvastatin 40 milliGRAM(s) Oral at bedtime  bisacodyl 5 milliGRAM(s) Oral every 12 hours PRN  dextrose 5%. 1000 milliLiter(s) IV Continuous <Continuous>  dextrose 5%. 1000 milliLiter(s) IV Continuous <Continuous>  dextrose 50% Injectable 12.5 Gram(s) IV Push once  dextrose 50% Injectable 25 Gram(s) IV Push once  dextrose 50% Injectable 25 Gram(s) IV Push once  dextrose Oral Gel 15 Gram(s) Oral once PRN  finasteride 5 milliGRAM(s) Oral daily  glucagon  Injectable 1 milliGRAM(s) IntraMuscular once  HYDROmorphone  Injectable 0.5 milliGRAM(s) IV Push every 4 hours PRN  insulin glargine Injectable (LANTUS) 8 Unit(s) SubCutaneous at bedtime  insulin lispro (ADMELOG) corrective regimen sliding scale   SubCutaneous three times a day before meals  insulin lispro Injectable (ADMELOG) 3 Unit(s) SubCutaneous three times a day before meals  ketorolac   Injectable 30 milliGRAM(s) IV Push every 6 hours PRN  melatonin 3 milliGRAM(s) Oral at bedtime PRN  ondansetron Injectable 4 milliGRAM(s) IV Push every 8 hours PRN  tamsulosin 0.4 milliGRAM(s) Oral at bedtime      Objective:  Vital Signs Last 24 Hrs  T(C): 37 (14 Jul 2023 08:25), Max: 37.6 (13 Jul 2023 15:30)  T(F): 98.6 (14 Jul 2023 08:25), Max: 99.7 (13 Jul 2023 15:30)  HR: 82 (14 Jul 2023 07:44) (65 - 83)  BP: 167/81 (14 Jul 2023 07:44) (142/72 - 187/86)  BP(mean): 117 (14 Jul 2023 07:44) (101 - 126)  RR: 21 (14 Jul 2023 07:44) (11 - 24)  SpO2: 96% (14 Jul 2023 07:44) (94% - 98%)    Parameters below as of 14 Jul 2023 07:44  Patient On (Oxygen Delivery Method): room air        T(C): 37 (07-14-23 @ 08:25), Max: 37.6 (07-13-23 @ 15:30)  T(C): 37 (07-14-23 @ 08:25), Max: 38.7 (07-11-23 @ 11:46)  T(C): 37 (07-14-23 @ 08:25), Max: 38.7 (07-11-23 @ 11:46)    PHYSICAL EXAM:  HEENT: NC atraumatic  Neck: supple  Respiratory: no accessory muscle use, breathing comfortably  Cardiovascular: distant  Gastrointestinal: normal appearing, nondistended  Extremities: no clubbing, no cyanosis,        LABS:                          12.7   8.03  )-----------( 280      ( 14 Jul 2023 05:55 )             38.2       WBC  8.03 07-14 @ 05:55  8.20 07-13 @ 08:25  9.50 07-13 @ 01:16  8.56 07-12 @ 08:45  9.06 07-11 @ 06:40  4.06 07-10 @ 01:30  4.79 07-08 @ 04:10      07-13    140  |  106  |  14  ----------------------------<  173<H>  3.7   |  28  |  0.76    Ca    8.5      13 Jul 2023 08:25  Phos  3.4     07-13  Mg     1.8     07-13        Creatinine: 0.76 mg/dL (07-13-23 @ 08:25)  Creatinine: 0.89 mg/dL (07-12-23 @ 08:45)  Creatinine: 1.00 mg/dL (07-11-23 @ 06:40)  Creatinine: 1.10 mg/dL (07-10-23 @ 01:30)  Creatinine: 1.10 mg/dL (07-08-23 @ 04:10)      PT/INR - ( 13 Jul 2023 08:25 )   PT: 16.5 sec;   INR: 1.41 ratio         PTT - ( 13 Jul 2023 08:25 )  PTT:135.4 sec  Urinalysis Basic - ( 13 Jul 2023 08:25 )    Color: x / Appearance: x / SG: x / pH: x  Gluc: 173 mg/dL / Ketone: x  / Bili: x / Urobili: x   Blood: x / Protein: x / Nitrite: x   Leuk Esterase: x / RBC: x / WBC x   Sq Epi: x / Non Sq Epi: x / Bacteria: x            INFLAMMATORY MARKERS      MICROBIOLOGY:              RADIOLOGY & ADDITIONAL STUDIES:

## 2023-07-14 NOTE — DISCHARGE NOTE PROVIDER - NSDCMRMEDTOKEN_GEN_ALL_CORE_FT
AMLODIPINE   TAB 5M tablet orally once a day  aspirin 81 mg oral delayed release tablet: 1 tab(s) orally once a day  ATORVASTATIN TAB 10M tablet orally once a day (at bedtime)  dutasteride 0.5 mg oral capsule: 1 orally once a day  Flomax 0.4 mg oral capsule: 1 orally once a day (at bedtime)  glimepiride 2 mg oral tablet: 1 orally once a day  METFORMIN    TAB 500Mmg orally 2 times a day  valsartan 320 mg oral tablet: 1 orally once a day  Xarelto 20 mg oral tablet: 1 orally once a day

## 2023-07-14 NOTE — PROGRESS NOTE ADULT - SUBJECTIVE AND OBJECTIVE BOX
Post Diagnostic Cardiac Catheterization RRA Access Assessment  POD #1    No overnight events on CPU  Tele-NSR 60s no ectopy     Patient without complaints. Denies CP, SOB, palpitations, N/V, fever/chills, abd pain, numbness/tingling/weakness, other c/o at this time.    RRA access site stable (clean, dry, intact, without bleeding, heat, erythema, or hematoma). Tegaderm drsg removed this am  RUE motor, neuro, circ intact.  Hemodynamically stable, neurologically intact, VS stable, afebrile    Cath Lab Report    Diagnostic Cardiologist:       Jing Parker MD   Referring Physician:           Jeane Shrestha,      Procedures Performed   Procedures:               1.    Arterial Access - Right Radial     2.    Left Heart Cath   3.    Diagnostic Coronary Angiography     Indications:               Myocardial infarction without ST elevation  (NSTEMI)    Diagnostic Conclusions:     Mild CAD. No evidence of severe CAD to account for rise in troponin   Recommendations:     Medical therapy.      Acute complication:    No complications     Procedure Narrative:   The risks and alternatives of the procedures and conscious sedation  were explained to the patient and informed consent was  obtained. The patient was brought to the cath lab and placed on the  exam table.  Access   Right radial artery:   The puncture site was infiltrated with 1% Lidocaine. Vascular access  was obtained using modified seldinger technique under  ultrasound imaging guidance and a 6FR PRELUDE KIT was advanced into  the vessel.    Coronary Angiography   Left Coronary System:   A catheter was positioned into the vessel ostia under fluoroscopic  guidance. Angiograms were obtained in multiple views.  Right Coronary System:   A catheter was positioned into the vessel ostia under fluoroscopic  guidance. Angiograms were obtained in multiple views.    Diagnostic Findings:     Coronary Angiography   The coronary circulation is right dominant.      LM   Left main artery: The segment is visually normal in size and  structure.    Patient: GEORGIE CANELA              MRN: [id number]  Study Date: 07/13/2023   11:03 AM      Page 1 of 3          LAD   Left anterior descending artery: Angiography shows mild  atherosclerosis. First diagonal: Angiography shows mild  atherosclerosis.      CX   Circumflex: Thesegment is visually normal in size and structure.      RCA   Right coronary artery: Angiography shows minor irregularities.      Ramus   Ramus intermedius: Angiography shows minor irregularities.      Left Heart Cath   LHC performed: Aortic valve crossed and left ventricular pressures  were obtained.    History and Risk Factors:   Dyslipidemia:                                 Yes     X-Ray:   Diagnostic Flouro time:       2.2 min.                     Exam record  DAP:  Interventional Flouro time:                               Exam fluoro  DAP:            2562.00 cGycm2  Total Flouro Time:            2.2 min.                     Exam total  DAP:             2562.00  cGycm2

## 2023-07-14 NOTE — PROGRESS NOTE ADULT - REASON FOR ADMISSION
rigors, back pain

## 2023-07-14 NOTE — PROGRESS NOTE ADULT - SUBJECTIVE AND OBJECTIVE BOX
[facility] -- Pedro Saeed Pannella, Patel, Savella, Goodger  Office # [phone]    Follow Up:  Elev trops    Subjective/Observations: Patient seen and examined, awake, alert, sitting comfortably in the chair, denies chest pain, dyspnea, palpitations or dizziness, orthopnea and PND. Tolerating room air.      REVIEW OF SYSTEMS: All other review of systems is negative unless indicated above  PAST MEDICAL & SURGICAL HISTORY:  Type 2 diabetes mellitus      Hyperlipidemia      Hypertension      History of colonoscopy  (2014)        MEDICATIONS  (STANDING):  amLODIPine   Tablet 5 milliGRAM(s) Oral daily  aspirin enteric coated 81 milliGRAM(s) Oral daily  atorvastatin 40 milliGRAM(s) Oral at bedtime  dextrose 5%. 1000 milliLiter(s) (50 mL/Hr) IV Continuous <Continuous>  dextrose 5%. 1000 milliLiter(s) (100 mL/Hr) IV Continuous <Continuous>  dextrose 50% Injectable 12.5 Gram(s) IV Push once  dextrose 50% Injectable 25 Gram(s) IV Push once  dextrose 50% Injectable 25 Gram(s) IV Push once  finasteride 5 milliGRAM(s) Oral daily  glucagon  Injectable 1 milliGRAM(s) IntraMuscular once  insulin glargine Injectable (LANTUS) 8 Unit(s) SubCutaneous at bedtime  insulin lispro (ADMELOG) corrective regimen sliding scale   SubCutaneous three times a day before meals  insulin lispro Injectable (ADMELOG) 3 Unit(s) SubCutaneous three times a day before meals  tamsulosin 0.4 milliGRAM(s) Oral at bedtime    MEDICATIONS  (PRN):  acetaminophen     Tablet .. 650 milliGRAM(s) Oral every 6 hours PRN Temp greater or equal to 38C (100.4F), Mild Pain (1 - 3)  aluminum hydroxide/magnesium hydroxide/simethicone Suspension 30 milliLiter(s) Oral every 4 hours PRN Dyspepsia  bisacodyl 5 milliGRAM(s) Oral every 12 hours PRN Constipation  dextrose Oral Gel 15 Gram(s) Oral once PRN Blood Glucose LESS THAN 70 milliGRAM(s)/deciliter  HYDROmorphone  Injectable 0.5 milliGRAM(s) IV Push every 4 hours PRN Severe Pain (7 - 10)  ketorolac   Injectable 30 milliGRAM(s) IV Push every 6 hours PRN Moderate Pain (4 - 6)  melatonin 3 milliGRAM(s) Oral at bedtime PRN Insomnia  ondansetron Injectable 4 milliGRAM(s) IV Push every 8 hours PRN Nausea and/or Vomiting    Allergies    No Known Allergies    Intolerances      Vital Signs Last 24 Hrs  T(C): 37 (14 Jul 2023 08:25), Max: 37.6 (13 Jul 2023 15:30)  T(F): 98.6 (14 Jul 2023 08:25), Max: 99.7 (13 Jul 2023 15:30)  HR: 82 (14 Jul 2023 07:44) (65 - 83)  BP: 167/81 (14 Jul 2023 07:44) (142/72 - 187/86)  BP(mean): 117 (14 Jul 2023 07:44) (101 - 126)  RR: 21 (14 Jul 2023 07:44) (11 - 24)  SpO2: 96% (14 Jul 2023 07:44) (94% - 98%)    Parameters below as of 14 Jul 2023 07:44  Patient On (Oxygen Delivery Method): room air      I&O's Summary    13 Jul 2023 07:01  -  14 Jul 2023 07:00  --------------------------------------------------------  IN: 680 mL / OUT: 1500 mL / NET: -820 mL      Weight (kg): 116 (07-13 @ 15:30)  TELE: SR 80's   PHYSICAL EXAM:  Constitutional: NAD, awake and alert, obese  HEENT: Moist Mucous Membranes, Anicteric  Pulmonary: Non-labored, breath sounds are clear bilaterally, No wheezing, rales or rhonchi  Cardiovascular: Regular, S1 and S2, No murmurs, rubs, gallops or clicks  Gastrointestinal: Bowel Sounds present, soft, nontender.   Lymph: trace peripheral edema. No lymphadenopathy.  Skin: No visible rashes or ulcers.  Psych:  Mood & affect appropriate  LABS: All Labs Reviewed:                        12.7   8.03  )-----------( 280      ( 14 Jul 2023 05:55 )             38.2                         11.9   8.20  )-----------( 232      ( 13 Jul 2023 08:25 )             36.7                         12.9   9.50  )-----------( 239      ( 13 Jul 2023 01:16 )             38.6     13 Jul 2023 08:25    140    |  106    |  14     ----------------------------<  173    3.7     |  28     |  0.76   12 Jul 2023 08:45    137    |  102    |  16     ----------------------------<  209    3.7     |  30     |  0.89     Ca    8.5        13 Jul 2023 08:25  Ca    8.9        12 Jul 2023 08:45  Phos  3.4       13 Jul 2023 08:25  Mg     1.8       13 Jul 2023 08:25    TPro  6.9    /  Alb  2.7    /  TBili  0.7    /  DBili  x      /  AST  35     /  ALT  30     /  AlkPhos  53     12 Jul 2023 08:45    PT/INR - ( 13 Jul 2023 08:25 )   PT: 16.5 sec;   INR: 1.41 ratio         PTT - ( 13 Jul 2023 08:25 )  PTT:135.4 sec      12 Lead ECG:   Ventricular Rate 81 BPM    Atrial Rate 81 BPM    P-R Interval 128 ms    QRS Duration 82 ms    Q-T Interval 392 ms    QTC Calculation(Bazett) 455 ms    P Axis 28 degrees    R Axis -4 degrees    T Axis 29 degrees    Diagnosis Line Normal sinus rhythm  Normal ECG  When compared with ECG of 10-JUL-2023 06:35,  No significant change was found  Confirmed by Alfred Vasquez MD ([id number]) on 7/13/2023 12:52:42 PM (07-13-23 @ 08:59)

## 2023-07-14 NOTE — DISCHARGE NOTE PROVIDER - NSDCCPCAREPLAN_GEN_ALL_CORE_FT
PRINCIPAL DISCHARGE DIAGNOSIS  Diagnosis: Fever  Assessment and Plan of Treatment: FUO- no source of infection identified, remained stable off abx  LBP- CT spine- no e/o infection      SECONDARY DISCHARGE DIAGNOSES  Diagnosis: UTI (urinary tract infection)  Assessment and Plan of Treatment:     Diagnosis: High serum lactic acid  Assessment and Plan of Treatment:     Diagnosis: Elevated troponin  Assessment and Plan of Treatment: sp cath-luminal irregularities, TTE- normal study    Diagnosis: Bladder stones  Assessment and Plan of Treatment:

## 2023-07-14 NOTE — DISCHARGE NOTE PROVIDER - HOSPITAL COURSE
58M with hx of PE/DVT 8/2022, DM, HTN, BPH, HLD presents with high fevers, rigors and sob. associated w  complains of lower back pain in the middle just above his buttocks and several transient episodes of dyspnea  labs cw elevated troponin    #FUO  no clear source of infection, cultures NGTD  CT with large prostate and bladder stones  radiculopathy? ct ls spine- unremarkable  ? MRI if remains febrile and symptomatic  PSA, crp, and procal markedly elevated of unclear significance  ID, pulm and urology following  clinically stable off abx, remained afebrile and asymptomatic  for 48hrs prior to dc  fu ID in 1-2wks    #elevated -troponin-#NSTEMI vs Demand Ischemia/  hx of DVT and PE  TTE- unremarkable  - EKG without sig ischemic changes.   s/p diagnostic LHC via RRA (7/13)-sp cath- nonobstructive coronaries, some luminal irregularities  - continue ASA and statin  -to be managed medically.  outpt fu    #HTN-resume home valsartan and amlodipine  pcpc fu  #Diabetes-resume home meds

## 2023-07-14 NOTE — PROGRESS NOTE ADULT - SUBJECTIVE AND OBJECTIVE BOX
Date/Time Patient Seen:  		  Referring MD:   Data Reviewed	       Patient is a 58y old  Male who presents with a chief complaint of rigors, back pain (13 Jul 2023 17:16)      Subjective/HPI     PAST MEDICAL & SURGICAL HISTORY:  Type 2 diabetes mellitus    Hyperlipidemia    Hypertension    History of colonoscopy  (2014)          Medication list         MEDICATIONS  (STANDING):  amLODIPine   Tablet 5 milliGRAM(s) Oral daily  aspirin enteric coated 81 milliGRAM(s) Oral daily  atorvastatin 40 milliGRAM(s) Oral at bedtime  dextrose 5%. 1000 milliLiter(s) (100 mL/Hr) IV Continuous <Continuous>  dextrose 5%. 1000 milliLiter(s) (50 mL/Hr) IV Continuous <Continuous>  dextrose 50% Injectable 12.5 Gram(s) IV Push once  dextrose 50% Injectable 25 Gram(s) IV Push once  dextrose 50% Injectable 25 Gram(s) IV Push once  finasteride 5 milliGRAM(s) Oral daily  glucagon  Injectable 1 milliGRAM(s) IntraMuscular once  insulin glargine Injectable (LANTUS) 8 Unit(s) SubCutaneous at bedtime  insulin lispro (ADMELOG) corrective regimen sliding scale   SubCutaneous three times a day before meals  insulin lispro Injectable (ADMELOG) 3 Unit(s) SubCutaneous three times a day before meals  tamsulosin 0.4 milliGRAM(s) Oral at bedtime    MEDICATIONS  (PRN):  acetaminophen     Tablet .. 650 milliGRAM(s) Oral every 6 hours PRN Temp greater or equal to 38C (100.4F), Mild Pain (1 - 3)  aluminum hydroxide/magnesium hydroxide/simethicone Suspension 30 milliLiter(s) Oral every 4 hours PRN Dyspepsia  bisacodyl 5 milliGRAM(s) Oral every 12 hours PRN Constipation  dextrose Oral Gel 15 Gram(s) Oral once PRN Blood Glucose LESS THAN 70 milliGRAM(s)/deciliter  HYDROmorphone  Injectable 0.5 milliGRAM(s) IV Push every 4 hours PRN Severe Pain (7 - 10)  ketorolac   Injectable 30 milliGRAM(s) IV Push every 6 hours PRN Moderate Pain (4 - 6)  melatonin 3 milliGRAM(s) Oral at bedtime PRN Insomnia  ondansetron Injectable 4 milliGRAM(s) IV Push every 8 hours PRN Nausea and/or Vomiting         Vitals log        ICU Vital Signs Last 24 Hrs  T(C): 36.8 (14 Jul 2023 04:15), Max: 37.6 (13 Jul 2023 15:30)  T(F): 98.2 (14 Jul 2023 04:15), Max: 99.7 (13 Jul 2023 15:30)  HR: 70 (14 Jul 2023 04:15) (65 - 89)  BP: 167/82 (14 Jul 2023 04:15) (142/72 - 178/91)  BP(mean): 117 (14 Jul 2023 04:15) (101 - 117)  ABP: --  ABP(mean): --  RR: 18 (14 Jul 2023 00:00) (11 - 20)  SpO2: 95% (14 Jul 2023 00:00) (93% - 98%)    O2 Parameters below as of 14 Jul 2023 04:15  Patient On (Oxygen Delivery Method): room air                 Input and Output:  I&O's Detail    12 Jul 2023 07:01  -  13 Jul 2023 07:00  --------------------------------------------------------  IN:    Heparin Infusion: 241 mL  Total IN: 241 mL    OUT:  Total OUT: 0 mL    Total NET: 241 mL      13 Jul 2023 07:01  -  14 Jul 2023 05:30  --------------------------------------------------------  IN:    Oral Fluid: 560 mL  Total IN: 560 mL    OUT:    Voided (mL): 1000 mL  Total OUT: 1000 mL    Total NET: -440 mL          Lab Data                        11.9   8.20  )-----------( 232      ( 13 Jul 2023 08:25 )             36.7     07-13    140  |  106  |  14  ----------------------------<  173<H>  3.7   |  28  |  0.76    Ca    8.5      13 Jul 2023 08:25  Phos  3.4     07-13  Mg     1.8     07-13    TPro  6.9  /  Alb  2.7<L>  /  TBili  0.7  /  DBili  x   /  AST  35  /  ALT  30  /  AlkPhos  53  07-12            Review of Systems	      Objective     Physical Examination    heart s1s2  lung dc BS  head nc      Pertinent Lab findings & Imaging      Silverio:  NO   Adequate UO     I&O's Detail    12 Jul 2023 07:01  -  13 Jul 2023 07:00  --------------------------------------------------------  IN:    Heparin Infusion: 241 mL  Total IN: 241 mL    OUT:  Total OUT: 0 mL    Total NET: 241 mL      13 Jul 2023 07:01  -  14 Jul 2023 05:30  --------------------------------------------------------  IN:    Oral Fluid: 560 mL  Total IN: 560 mL    OUT:    Voided (mL): 1000 mL  Total OUT: 1000 mL    Total NET: -440 mL               Discussed with:     Cultures:	        Radiology

## 2023-07-15 LAB
CULTURE RESULTS: SIGNIFICANT CHANGE UP
CULTURE RESULTS: SIGNIFICANT CHANGE UP
SPECIMEN SOURCE: SIGNIFICANT CHANGE UP
SPECIMEN SOURCE: SIGNIFICANT CHANGE UP

## 2023-07-17 LAB — B QUINTANA DNA SPEC QL NAA+PROBE: NEGATIVE — SIGNIFICANT CHANGE UP

## 2024-03-21 NOTE — PROGRESS NOTE ADULT - ASSESSMENT
59 y/o male h/o DM type-2 HTN HLD, PE/DVT on Xarelto BIBEMS  with shortness of breath.    NSTEMI vs Demand Ischemia/DVT and PE  - CE's elevated and hsT peaked to 1100; downtrended to 700's  - EKG without sig ischemic changes.  Pain free  - Follow up TTE official report  - For cardiac cath today, 7/13.  Patient remains amenable  - Start ASA 81 mg and increase statin to 40 mg    - He has a known PE/DVT and on Xarelto.  Last dose 7/11  - CTPA neg of PE.  SOB can be his anginal equivalent  - Started on Heparin gtt in place of Xarelto, 7/12  - Would resume Xarelto when appropriate    - PT with recurrent fevers and elevated lactate on presentation likely infectious in nature.  However, infection w/u negative  - ID following; off Abx.  Lumbar imaging unremarkable.  Repeat blood C/s pending.  cleared by ID for ischemic eval  - Compensated from HF POV, though, with trace BLE edema    -  eval noted  - No further w/u or intervention    - Monitor and replete lytes, keep K>4, Mg>2.  - Will continue to follow.    Laisha Gibbs DNP, NP-C, AGACNP-C  Cardiology   Call TEAMS          Sent note to her mychart. Please notify patient.